# Patient Record
Sex: FEMALE | Race: WHITE | NOT HISPANIC OR LATINO | Employment: OTHER | URBAN - METROPOLITAN AREA
[De-identification: names, ages, dates, MRNs, and addresses within clinical notes are randomized per-mention and may not be internally consistent; named-entity substitution may affect disease eponyms.]

---

## 2023-11-20 ENCOUNTER — APPOINTMENT (OUTPATIENT)
Dept: RADIOLOGY | Facility: HOSPITAL | Age: 88
DRG: 082 | End: 2023-11-20
Payer: MEDICARE

## 2023-11-20 ENCOUNTER — HOSPITAL ENCOUNTER (INPATIENT)
Facility: HOSPITAL | Age: 88
LOS: 2 days | Discharge: HOME HEALTH | DRG: 082 | End: 2023-11-22
Attending: EMERGENCY MEDICINE | Admitting: SURGERY
Payer: MEDICARE

## 2023-11-20 DIAGNOSIS — S22.41XA CLOSED FRACTURE OF MULTIPLE RIBS OF RIGHT SIDE, INITIAL ENCOUNTER: ICD-10-CM

## 2023-11-20 DIAGNOSIS — R09.02 HYPOXIA: ICD-10-CM

## 2023-11-20 DIAGNOSIS — S06.5XAA SUBDURAL HEMATOMA (MULTI): ICD-10-CM

## 2023-11-20 DIAGNOSIS — S22.49XA: ICD-10-CM
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
## 2023-11-20 PROBLEM — S22.41XD CLOSED FRACTURE OF MULTIPLE RIBS OF RIGHT SIDE WITH ROUTINE HEALING: Status: ACTIVE | Noted: 2023-11-20

## 2023-11-20 LAB
ANION GAP SERPL CALC-SCNC: 13 MMOL/L (ref 10–20)
BUN SERPL-MCNC: 24 MG/DL (ref 6–23)
CALCIUM SERPL-MCNC: 9 MG/DL (ref 8.6–10.3)
CHLORIDE SERPL-SCNC: 100 MMOL/L (ref 98–107)
CK SERPL-CCNC: 28 U/L (ref 0–215)
CO2 SERPL-SCNC: 24 MMOL/L (ref 21–32)
CREAT SERPL-MCNC: 0.99 MG/DL (ref 0.5–1.05)
ERYTHROCYTE [DISTWIDTH] IN BLOOD BY AUTOMATED COUNT: 15.1 % (ref 11.5–14.5)
GFR SERPL CREATININE-BSD FRML MDRD: 55 ML/MIN/1.73M*2
GLUCOSE SERPL-MCNC: 95 MG/DL (ref 74–99)
HCT VFR BLD AUTO: 39.5 % (ref 36–46)
HGB BLD-MCNC: 12.5 G/DL (ref 12–16)
HOLD SPECIMEN: NORMAL
MCH RBC QN AUTO: 30.5 PG (ref 26–34)
MCHC RBC AUTO-ENTMCNC: 31.6 G/DL (ref 32–36)
MCV RBC AUTO: 96 FL (ref 80–100)
NRBC BLD-RTO: 0 /100 WBCS (ref 0–0)
PLATELET # BLD AUTO: 209 X10*3/UL (ref 150–450)
POTASSIUM SERPL-SCNC: 4.1 MMOL/L (ref 3.5–5.3)
RBC # BLD AUTO: 4.1 X10*6/UL (ref 4–5.2)
SODIUM SERPL-SCNC: 133 MMOL/L (ref 136–145)
WBC # BLD AUTO: 13.4 X10*3/UL (ref 4.4–11.3)

## 2023-11-20 PROCEDURE — 76377 3D RENDER W/INTRP POSTPROCES: CPT | Performed by: RADIOLOGY

## 2023-11-20 PROCEDURE — 2060000001 HC INTERMEDIATE ICU ROOM DAILY

## 2023-11-20 PROCEDURE — 36415 COLL VENOUS BLD VENIPUNCTURE: CPT | Performed by: PHYSICIAN ASSISTANT

## 2023-11-20 PROCEDURE — 99222 1ST HOSP IP/OBS MODERATE 55: CPT | Performed by: REGISTERED NURSE

## 2023-11-20 PROCEDURE — 80048 BASIC METABOLIC PNL TOTAL CA: CPT | Performed by: PHYSICIAN ASSISTANT

## 2023-11-20 PROCEDURE — 82550 ASSAY OF CK (CPK): CPT | Performed by: PHYSICIAN ASSISTANT

## 2023-11-20 PROCEDURE — 85027 COMPLETE CBC AUTOMATED: CPT | Performed by: PHYSICIAN ASSISTANT

## 2023-11-20 PROCEDURE — 72125 CT NECK SPINE W/O DYE: CPT

## 2023-11-20 PROCEDURE — 70450 CT HEAD/BRAIN W/O DYE: CPT

## 2023-11-20 PROCEDURE — 71045 X-RAY EXAM CHEST 1 VIEW: CPT | Mod: FY

## 2023-11-20 PROCEDURE — 70450 CT HEAD/BRAIN W/O DYE: CPT | Performed by: RADIOLOGY

## 2023-11-20 PROCEDURE — 2500000001 HC RX 250 WO HCPCS SELF ADMINISTERED DRUGS (ALT 637 FOR MEDICARE OP): Performed by: REGISTERED NURSE

## 2023-11-20 PROCEDURE — 70486 CT MAXILLOFACIAL W/O DYE: CPT | Performed by: RADIOLOGY

## 2023-11-20 PROCEDURE — 2500000004 HC RX 250 GENERAL PHARMACY W/ HCPCS (ALT 636 FOR OP/ED): Performed by: PHYSICIAN ASSISTANT

## 2023-11-20 PROCEDURE — 2500000004 HC RX 250 GENERAL PHARMACY W/ HCPCS (ALT 636 FOR OP/ED): Performed by: REGISTERED NURSE

## 2023-11-20 PROCEDURE — 71045 X-RAY EXAM CHEST 1 VIEW: CPT | Performed by: RADIOLOGY

## 2023-11-20 PROCEDURE — 99221 1ST HOSP IP/OBS SF/LOW 40: CPT | Performed by: NURSE PRACTITIONER

## 2023-11-20 PROCEDURE — 72125 CT NECK SPINE W/O DYE: CPT | Performed by: RADIOLOGY

## 2023-11-20 PROCEDURE — 71250 CT THORAX DX C-: CPT | Performed by: RADIOLOGY

## 2023-11-20 PROCEDURE — 99285 EMERGENCY DEPT VISIT HI MDM: CPT | Mod: 25 | Performed by: EMERGENCY MEDICINE

## 2023-11-20 PROCEDURE — 71250 CT THORAX DX C-: CPT

## 2023-11-20 PROCEDURE — 2500000005 HC RX 250 GENERAL PHARMACY W/O HCPCS: Performed by: PHYSICIAN ASSISTANT

## 2023-11-20 PROCEDURE — 70486 CT MAXILLOFACIAL W/O DYE: CPT

## 2023-11-20 PROCEDURE — 72170 X-RAY EXAM OF PELVIS: CPT | Performed by: RADIOLOGY

## 2023-11-20 PROCEDURE — 72170 X-RAY EXAM OF PELVIS: CPT | Mod: FY

## 2023-11-20 PROCEDURE — 99222 1ST HOSP IP/OBS MODERATE 55: CPT | Performed by: SURGERY

## 2023-11-20 RX ORDER — DICLOFENAC SODIUM 10 MG/G
4 GEL TOPICAL 3 TIMES DAILY PRN
COMMUNITY

## 2023-11-20 RX ORDER — HYDROCHLOROTHIAZIDE 12.5 MG/1
12.5 TABLET ORAL DAILY
Status: DISCONTINUED | OUTPATIENT
Start: 2023-11-20 | End: 2023-11-22 | Stop reason: HOSPADM

## 2023-11-20 RX ORDER — LIDOCAINE 560 MG/1
1 PATCH PERCUTANEOUS; TOPICAL; TRANSDERMAL DAILY
Status: DISCONTINUED | OUTPATIENT
Start: 2023-11-20 | End: 2023-11-20

## 2023-11-20 RX ORDER — MELOXICAM 15 MG/1
15 TABLET ORAL DAILY
Status: DISCONTINUED | OUTPATIENT
Start: 2023-11-20 | End: 2023-11-22 | Stop reason: HOSPADM

## 2023-11-20 RX ORDER — MELOXICAM 15 MG/1
15 TABLET ORAL DAILY
Status: ON HOLD | COMMUNITY
Start: 2020-09-30 | End: 2023-11-22 | Stop reason: SDUPTHER

## 2023-11-20 RX ORDER — IPRATROPIUM BROMIDE AND ALBUTEROL SULFATE 2.5; .5 MG/3ML; MG/3ML
3 SOLUTION RESPIRATORY (INHALATION) EVERY 4 HOURS PRN
Status: DISCONTINUED | OUTPATIENT
Start: 2023-11-20 | End: 2023-11-22 | Stop reason: HOSPADM

## 2023-11-20 RX ORDER — BRIMONIDINE TARTRATE AND TIMOLOL MALEATE 2; 5 MG/ML; MG/ML
1 SOLUTION OPHTHALMIC 2 TIMES DAILY
COMMUNITY

## 2023-11-20 RX ORDER — CYCLOSPORINE 0.5 MG/ML
1 EMULSION OPHTHALMIC 4 TIMES DAILY
COMMUNITY

## 2023-11-20 RX ORDER — HYDROCHLOROTHIAZIDE 12.5 MG/1
12.5 TABLET ORAL DAILY
COMMUNITY
End: 2023-11-30 | Stop reason: HOSPADM

## 2023-11-20 RX ORDER — ONDANSETRON HYDROCHLORIDE 2 MG/ML
4 INJECTION, SOLUTION INTRAVENOUS EVERY 8 HOURS PRN
Status: DISCONTINUED | OUTPATIENT
Start: 2023-11-20 | End: 2023-11-22 | Stop reason: HOSPADM

## 2023-11-20 RX ORDER — BRIMONIDINE TARTRATE 2 MG/ML
1 SOLUTION/ DROPS OPHTHALMIC 2 TIMES DAILY
Status: DISCONTINUED | OUTPATIENT
Start: 2023-11-20 | End: 2023-11-22 | Stop reason: HOSPADM

## 2023-11-20 RX ORDER — ACETAMINOPHEN 325 MG/1
975 TABLET ORAL EVERY 6 HOURS
Status: DISCONTINUED | OUTPATIENT
Start: 2023-11-20 | End: 2023-11-22 | Stop reason: HOSPADM

## 2023-11-20 RX ORDER — HYDRALAZINE HYDROCHLORIDE 20 MG/ML
5 INJECTION INTRAMUSCULAR; INTRAVENOUS EVERY 4 HOURS PRN
Status: DISCONTINUED | OUTPATIENT
Start: 2023-11-20 | End: 2023-11-21

## 2023-11-20 RX ORDER — UBIDECARENONE 75 MG
500 CAPSULE ORAL DAILY
COMMUNITY

## 2023-11-20 RX ORDER — LIDOCAINE 560 MG/1
1 PATCH PERCUTANEOUS; TOPICAL; TRANSDERMAL DAILY
Status: DISCONTINUED | OUTPATIENT
Start: 2023-11-20 | End: 2023-11-22 | Stop reason: HOSPADM

## 2023-11-20 RX ORDER — ONDANSETRON 4 MG/1
4 TABLET, FILM COATED ORAL EVERY 8 HOURS PRN
Status: DISCONTINUED | OUTPATIENT
Start: 2023-11-20 | End: 2023-11-22 | Stop reason: HOSPADM

## 2023-11-20 RX ORDER — MIRABEGRON 50 MG/1
50 TABLET, EXTENDED RELEASE ORAL DAILY
COMMUNITY

## 2023-11-20 RX ORDER — CETIRIZINE HYDROCHLORIDE 10 MG/1
10 TABLET ORAL DAILY PRN
COMMUNITY

## 2023-11-20 RX ORDER — PANTOPRAZOLE SODIUM 40 MG/1
40 TABLET, DELAYED RELEASE ORAL
Status: DISCONTINUED | OUTPATIENT
Start: 2023-11-21 | End: 2023-11-22 | Stop reason: HOSPADM

## 2023-11-20 RX ORDER — OXYBUTYNIN CHLORIDE 5 MG/1
5 TABLET ORAL 3 TIMES DAILY
Status: DISCONTINUED | OUTPATIENT
Start: 2023-11-20 | End: 2023-11-22 | Stop reason: HOSPADM

## 2023-11-20 RX ORDER — ACETAMINOPHEN 325 MG/1
650 TABLET ORAL EVERY 6 HOURS PRN
COMMUNITY

## 2023-11-20 RX ORDER — NICARDIPINE HYDROCHLORIDE 0.2 MG/ML
2.5-15 INJECTION INTRAVENOUS CONTINUOUS
Status: DISCONTINUED | OUTPATIENT
Start: 2023-11-20 | End: 2023-11-20

## 2023-11-20 RX ORDER — FLUTICASONE PROPIONATE 50 MCG
1 SPRAY, SUSPENSION (ML) NASAL DAILY PRN
COMMUNITY

## 2023-11-20 RX ORDER — BRIMONIDINE TARTRATE AND TIMOLOL MALEATE 2; 5 MG/ML; MG/ML
1 SOLUTION OPHTHALMIC 2 TIMES DAILY
Status: DISCONTINUED | OUTPATIENT
Start: 2023-11-20 | End: 2023-11-20

## 2023-11-20 RX ORDER — CARBOXYMETHYLCELLULOSE SODIUM 10 MG/ML
1 GEL OPHTHALMIC 2 TIMES DAILY
Status: DISCONTINUED | OUTPATIENT
Start: 2023-11-20 | End: 2023-11-20

## 2023-11-20 RX ORDER — CITALOPRAM 20 MG/1
20 TABLET, FILM COATED ORAL DAILY
Status: DISCONTINUED | OUTPATIENT
Start: 2023-11-20 | End: 2023-11-22 | Stop reason: HOSPADM

## 2023-11-20 RX ORDER — SODIUM CHLORIDE 1000 MG
1 TABLET, SOLUBLE MISCELLANEOUS DAILY
Status: DISCONTINUED | OUTPATIENT
Start: 2023-11-20 | End: 2023-11-22 | Stop reason: HOSPADM

## 2023-11-20 RX ORDER — FERROUS SULFATE, DRIED 160(50) MG
1 TABLET, EXTENDED RELEASE ORAL DAILY
COMMUNITY

## 2023-11-20 RX ORDER — LATANOPROST 50 UG/ML
1 SOLUTION/ DROPS OPHTHALMIC NIGHTLY
COMMUNITY
Start: 2019-02-20

## 2023-11-20 RX ORDER — CYCLOSPORINE 0.5 MG/ML
1 EMULSION OPHTHALMIC
Status: DISCONTINUED | OUTPATIENT
Start: 2023-11-20 | End: 2023-11-22 | Stop reason: HOSPADM

## 2023-11-20 RX ORDER — CITALOPRAM 20 MG/1
20 TABLET, FILM COATED ORAL DAILY
COMMUNITY
Start: 2020-09-30

## 2023-11-20 RX ORDER — LORATADINE 10 MG/1
10 TABLET ORAL DAILY
Status: DISCONTINUED | OUTPATIENT
Start: 2023-11-20 | End: 2023-11-22 | Stop reason: HOSPADM

## 2023-11-20 RX ORDER — LATANOPROST 50 UG/ML
1 SOLUTION/ DROPS OPHTHALMIC NIGHTLY
Status: DISCONTINUED | OUTPATIENT
Start: 2023-11-20 | End: 2023-11-22 | Stop reason: HOSPADM

## 2023-11-20 RX ORDER — GABAPENTIN 100 MG/1
100 CAPSULE ORAL 3 TIMES DAILY
Status: DISCONTINUED | OUTPATIENT
Start: 2023-11-20 | End: 2023-11-22 | Stop reason: HOSPADM

## 2023-11-20 RX ORDER — FERROUS SULFATE, DRIED 160(50) MG
1 TABLET, EXTENDED RELEASE ORAL DAILY
Status: DISCONTINUED | OUTPATIENT
Start: 2023-11-20 | End: 2023-11-22 | Stop reason: HOSPADM

## 2023-11-20 RX ORDER — ALENDRONATE SODIUM 70 MG/1
70 TABLET ORAL
COMMUNITY
Start: 2020-09-30

## 2023-11-20 RX ORDER — CARBOXYMETHYLCELLULOSE SODIUM 5 MG/ML
1 SOLUTION/ DROPS OPHTHALMIC 2 TIMES DAILY
COMMUNITY

## 2023-11-20 RX ORDER — POLYETHYLENE GLYCOL 3350 17 G/17G
17 POWDER, FOR SOLUTION ORAL DAILY
Status: DISCONTINUED | OUTPATIENT
Start: 2023-11-20 | End: 2023-11-22 | Stop reason: HOSPADM

## 2023-11-20 RX ORDER — UBIDECARENONE 75 MG
500 CAPSULE ORAL DAILY
Status: DISCONTINUED | OUTPATIENT
Start: 2023-11-20 | End: 2023-11-22 | Stop reason: HOSPADM

## 2023-11-20 RX ORDER — OMEPRAZOLE 20 MG/1
20 CAPSULE, DELAYED RELEASE ORAL DAILY
COMMUNITY
Start: 2020-09-30

## 2023-11-20 RX ORDER — SODIUM CHLORIDE 1000 MG
1 TABLET, SOLUBLE MISCELLANEOUS DAILY
COMMUNITY

## 2023-11-20 RX ORDER — FLUORIDE TOOTHPASTE
15 TOOTHPASTE DENTAL 2 TIMES DAILY
COMMUNITY

## 2023-11-20 RX ADMIN — GABAPENTIN 100 MG: 100 CAPSULE ORAL at 22:54

## 2023-11-20 RX ADMIN — ACETAMINOPHEN 975 MG: 325 TABLET ORAL at 17:50

## 2023-11-20 RX ADMIN — ACETAMINOPHEN 975 MG: 325 TABLET ORAL at 22:52

## 2023-11-20 RX ADMIN — BRIMONIDINE TARTRATE 1 DROP: 2 SOLUTION/ DROPS OPHTHALMIC at 23:03

## 2023-11-20 RX ADMIN — GABAPENTIN 100 MG: 100 CAPSULE ORAL at 09:37

## 2023-11-20 RX ADMIN — LATANOPROST 1 DROP: 50 SOLUTION OPHTHALMIC at 23:02

## 2023-11-20 RX ADMIN — CYCLOSPORINE 1 DROP: 0.5 EMULSION OPHTHALMIC at 22:54

## 2023-11-20 RX ADMIN — OXYBUTYNIN CHLORIDE 5 MG: 5 TABLET ORAL at 22:54

## 2023-11-20 RX ADMIN — LIDOCAINE 1 PATCH: 4 PATCH TOPICAL at 09:27

## 2023-11-20 RX ADMIN — TIMOLOL 1 DROP: 5.12 SOLUTION/ DROPS OPHTHALMIC at 23:10

## 2023-11-20 RX ADMIN — NICARDIPINE HYDROCHLORIDE 2.5 MG/HR: 0.2 INJECTION, SOLUTION INTRAVENOUS at 09:37

## 2023-11-20 RX ADMIN — ACETAMINOPHEN 975 MG: 325 TABLET ORAL at 09:37

## 2023-11-20 RX ADMIN — GABAPENTIN 100 MG: 100 CAPSULE ORAL at 17:50

## 2023-11-20 SDOH — SOCIAL STABILITY: SOCIAL INSECURITY: DO YOU FEEL ANYONE HAS EXPLOITED OR TAKEN ADVANTAGE OF YOU FINANCIALLY OR OF YOUR PERSONAL PROPERTY?: NO

## 2023-11-20 SDOH — SOCIAL STABILITY: SOCIAL INSECURITY: ABUSE: ADULT

## 2023-11-20 SDOH — SOCIAL STABILITY: SOCIAL INSECURITY: HAS ANYONE EVER THREATENED TO HURT YOUR FAMILY OR YOUR PETS?: NO

## 2023-11-20 SDOH — SOCIAL STABILITY: SOCIAL INSECURITY: DOES ANYONE TRY TO KEEP YOU FROM HAVING/CONTACTING OTHER FRIENDS OR DOING THINGS OUTSIDE YOUR HOME?: NO

## 2023-11-20 SDOH — SOCIAL STABILITY: SOCIAL INSECURITY: DO YOU FEEL UNSAFE GOING BACK TO THE PLACE WHERE YOU ARE LIVING?: NO

## 2023-11-20 SDOH — SOCIAL STABILITY: SOCIAL INSECURITY: HAVE YOU HAD THOUGHTS OF HARMING ANYONE ELSE?: NO

## 2023-11-20 SDOH — SOCIAL STABILITY: SOCIAL INSECURITY: ARE YOU OR HAVE YOU BEEN THREATENED OR ABUSED PHYSICALLY, EMOTIONALLY, OR SEXUALLY BY ANYONE?: NO

## 2023-11-20 SDOH — SOCIAL STABILITY: SOCIAL INSECURITY: ARE THERE ANY APPARENT SIGNS OF INJURIES/BEHAVIORS THAT COULD BE RELATED TO ABUSE/NEGLECT?: NO

## 2023-11-20 SDOH — SOCIAL STABILITY: SOCIAL INSECURITY: WERE YOU ABLE TO COMPLETE ALL THE BEHAVIORAL HEALTH SCREENINGS?: YES

## 2023-11-20 ASSESSMENT — ACTIVITIES OF DAILY LIVING (ADL)
BATHING: NEEDS ASSISTANCE
DRESSING YOURSELF: NEEDS ASSISTANCE
HEARING - LEFT EAR: FUNCTIONAL
GROOMING: NEEDS ASSISTANCE
HEARING - RIGHT EAR: FUNCTIONAL
ASSISTIVE_DEVICE: WALKER
WALKS IN HOME: NEEDS ASSISTANCE
FEEDING YOURSELF: NEEDS ASSISTANCE
PATIENT'S MEMORY ADEQUATE TO SAFELY COMPLETE DAILY ACTIVITIES?: NO
JUDGMENT_ADEQUATE_SAFELY_COMPLETE_DAILY_ACTIVITIES: NO
ADEQUATE_TO_COMPLETE_ADL: YES
LACK_OF_TRANSPORTATION: NO
LACK_OF_TRANSPORTATION: NO
TOILETING: NEEDS ASSISTANCE

## 2023-11-20 ASSESSMENT — LIFESTYLE VARIABLES
REASON UNABLE TO ASSESS: YES
EVER FELT BAD OR GUILTY ABOUT YOUR DRINKING: NO
HOW OFTEN DO YOU HAVE A DRINK CONTAINING ALCOHOL: NEVER
PRESCIPTION_ABUSE_PAST_12_MONTHS: NO
AUDIT-C TOTAL SCORE: 0
SUBSTANCE_ABUSE_PAST_12_MONTHS: NO
HOW OFTEN DO YOU HAVE 6 OR MORE DRINKS ON ONE OCCASION: NEVER
HAVE PEOPLE ANNOYED YOU BY CRITICIZING YOUR DRINKING: NO
AUDIT-C TOTAL SCORE: 0
HOW MANY STANDARD DRINKS CONTAINING ALCOHOL DO YOU HAVE ON A TYPICAL DAY: PATIENT DOES NOT DRINK
EVER HAD A DRINK FIRST THING IN THE MORNING TO STEADY YOUR NERVES TO GET RID OF A HANGOVER: NO
SKIP TO QUESTIONS 9-10: 1
HAVE YOU EVER FELT YOU SHOULD CUT DOWN ON YOUR DRINKING: NO

## 2023-11-20 ASSESSMENT — COGNITIVE AND FUNCTIONAL STATUS - GENERAL
MOBILITY SCORE: 15
PATIENT BASELINE BEDBOUND: NO
TURNING FROM BACK TO SIDE WHILE IN FLAT BAD: A LITTLE
DRESSING REGULAR UPPER BODY CLOTHING: A LITTLE
PERSONAL GROOMING: A LITTLE
WALKING IN HOSPITAL ROOM: A LOT
DAILY ACTIVITIY SCORE: 18
DRESSING REGULAR LOWER BODY CLOTHING: A LITTLE
STANDING UP FROM CHAIR USING ARMS: A LITTLE
MOVING TO AND FROM BED TO CHAIR: A LOT
MOVING FROM LYING ON BACK TO SITTING ON SIDE OF FLAT BED WITH BEDRAILS: A LITTLE
HELP NEEDED FOR BATHING: A LITTLE
CLIMB 3 TO 5 STEPS WITH RAILING: A LOT
EATING MEALS: A LITTLE
TOILETING: A LITTLE

## 2023-11-20 ASSESSMENT — PAIN SCALES - PAIN ASSESSMENT IN ADVANCED DEMENTIA (PAINAD)
FACIALEXPRESSION: SMILING OR INEXPRESSIVE
BREATHING: NORMAL
BODYLANGUAGE: RELAXED
TOTALSCORE: 0
CONSOLABILITY: NO NEED TO CONSOLE

## 2023-11-20 ASSESSMENT — PATIENT HEALTH QUESTIONNAIRE - PHQ9
SUM OF ALL RESPONSES TO PHQ9 QUESTIONS 1 & 2: 0
1. LITTLE INTEREST OR PLEASURE IN DOING THINGS: NOT AT ALL
2. FEELING DOWN, DEPRESSED OR HOPELESS: NOT AT ALL

## 2023-11-20 ASSESSMENT — COLUMBIA-SUICIDE SEVERITY RATING SCALE - C-SSRS
1. IN THE PAST MONTH, HAVE YOU WISHED YOU WERE DEAD OR WISHED YOU COULD GO TO SLEEP AND NOT WAKE UP?: NO
6. HAVE YOU EVER DONE ANYTHING, STARTED TO DO ANYTHING, OR PREPARED TO DO ANYTHING TO END YOUR LIFE?: NO
2. HAVE YOU ACTUALLY HAD ANY THOUGHTS OF KILLING YOURSELF?: NO
2. HAVE YOU ACTUALLY HAD ANY THOUGHTS OF KILLING YOURSELF?: NO
6. HAVE YOU EVER DONE ANYTHING, STARTED TO DO ANYTHING, OR PREPARED TO DO ANYTHING TO END YOUR LIFE?: NO
1. IN THE PAST MONTH, HAVE YOU WISHED YOU WERE DEAD OR WISHED YOU COULD GO TO SLEEP AND NOT WAKE UP?: NO

## 2023-11-20 ASSESSMENT — PAIN - FUNCTIONAL ASSESSMENT
PAIN_FUNCTIONAL_ASSESSMENT: UNABLE TO SELF-REPORT
PAIN_FUNCTIONAL_ASSESSMENT: PAINAD (PAIN ASSESSMENT IN ADVANCED DEMENTIA SCALE)

## 2023-11-20 NOTE — ED PROVIDER NOTES
Limitations to History: clinical condition  External Records Reviewed  Independent Historians: none  Social determinants affecting care: none    HPI  Ayse Cardoza is a 89 y.o. female polymyalgia rheumatica, glaucoma, arthritis, hypertension, GERD, chronic kidney disease, asthma, venous insufficiency, anemia, depression, mild cognitive impairment, DVT, who presents to the emergency department for assessment of a fall today.  She is unsure what happened.  She is complaining of pain to the right side of her chest.  She is unsure of loss of consciousness.  She is not on anticoagulants.  She presents to the ER from an assisted living facility.  Attempted to get a hold of the nursing staff but unsuccessful.  Her paperwork shows that she is a DNR ml.  [facility]  Past Medical History:   Diagnosis Date    Anemia     Asthma     Chronic hyponatremia     CKD (chronic kidney disease)     Depression     HTN (hypertension)     MCI (mild cognitive impairment)     Polymyalgia rheumatica (CMS/HCC)     SDH (subdural hematoma) (CMS/HCC) 11/20/2023    Stroke (cerebrum) (CMS/[facility])     White coat syndrome with hypertension     reviewed by myself.    Meds  Current Outpatient Medications   Medication Instructions    acetaminophen (TYLENOL) 650 mg, oral, Every 6 hours PRN    alendronate (FOSAMAX) 70 mg, oral, Every 7 days    brimonidine-timoloL (Combigan) 0.2-0.5 % ophthalmic solution 1 drop, Both Eyes, 2 times daily    calcium carbonate-vitamin D3 500 mg-5 mcg (200 unit) tablet 1 tablet, oral, Daily    carboxymethylcellulose (Refresh Plus) 0.5 % ophthalmic solution 1 drop, Both Eyes, 2 times daily    cetirizine (ZYRTEC) 10 mg, oral, Daily PRN    citalopram (CELEXA) 20 mg, oral, Daily    cyanocobalamin (VITAMIN B-12) 500 mcg, oral, Daily    cycloSPORINE (Restasis) 0.05 % ophthalmic emulsion 1 drop, Both Eyes, 4 times daily    diclofenac sodium (VOLTAREN) 4 g, Topical, 3 times daily PRN, To right lower leg    fluticasone (Flonase) 50  "mcg/actuation nasal spray 1 spray, Each Nostril, Daily PRN, Shake gently. Before first use, prime pump. After use, clean tip and replace cap.    hydroCHLOROthiazide (HYDRODIURIL) 12.5 mg, oral, Daily    latanoprost (Xalatan) 0.005 % ophthalmic solution 1 drop, Both Eyes, Nightly    meloxicam (MOBIC) 15 mg, oral, Daily    mirabegron (MYRBETRIQ) 50 mg, oral, Daily    moisturizing mouth (Biotene Dry Mouth Oral Rinse) solution 15 mL, Swish & Spit, 2 times daily    omeprazole (PRILOSEC) 20 mg, oral, Daily    sodium chloride 1 g, oral, Daily       Allergies  Allergies   Allergen Reactions    Fenofibrate Myalgia    Statins-Hmg-Coa Reductase Inhibitors Myalgia    Cefazolin Rash    Gabapentin Other     felt goofy on it    Lisinopril Cough    Penicillins Rash    reviewed by myself.    SHx  Social History     Tobacco Use    Smoking status: Never    Smokeless tobacco: Never   Substance Use Topics    Alcohol use: Yes     Comment: rare    Drug use: Not Currently    reviewed by myself.      ------------------------------------------------------------------------------------------------------------------------------------------    /51   Pulse 69   Resp 20   Ht 1.651 m (5' 5\")   Wt 95.3 kg (210 lb)   SpO2 99%   BMI 34.95 kg/m²     Physical Exam  Vitals and nursing note reviewed.   Constitutional:       General: She is not in acute distress.     Appearance: Normal appearance. She is normal weight. She is not ill-appearing or toxic-appearing.   HENT:      Head: Normocephalic. Contusion (left cheek) and laceration (left cheek 2cm linear, nongaping) present. No raccoon eyes or Rodriguez's sign.      Jaw: No trismus.      Ears:      Comments: Hearing aids     Nose: Nose normal.      Mouth/Throat:      Mouth: Mucous membranes are moist.      Dentition: Normal dentition. No dental tenderness.      Pharynx: Oropharynx is clear.      Comments: Puncture wound right upper lip that does not fully go through the lip. Skin tear upper " lip.  Eyes:      Extraocular Movements: Extraocular movements intact.      Conjunctiva/sclera: Conjunctivae normal.      Pupils: Pupils are equal, round, and reactive to light.   Cardiovascular:      Rate and Rhythm: Normal rate and regular rhythm.   Pulmonary:      Effort: Pulmonary effort is normal.      Breath sounds: No decreased air movement (throughout).      Comments: Slight expiratory wheeze on the right  Chest:      Comments: Right sided chest wall tenderness without flail chest or crepitus. No bruising to chest wall  Abdominal:      General: Abdomen is flat. Bowel sounds are normal.      Palpations: Abdomen is soft.      Tenderness: There is no abdominal tenderness.   Musculoskeletal:         General: Normal range of motion.      Cervical back: Normal range of motion and neck supple. No crepitus. No pain with movement or spinous process tenderness. Normal range of motion.      Comments: Moving all extremities.   Skin:     General: Skin is warm and dry.      Comments: Bruise right shin   Neurological:      General: No focal deficit present.      Mental Status: She is alert. Mental status is at baseline.      GCS: GCS eye subscore is 4. GCS verbal subscore is 5. GCS motor subscore is 6.      Cranial Nerves: Cranial nerves 2-12 are intact.      Sensory: Sensation is intact.      Motor: Motor function is intact.      Coordination: Coordination is intact.   Psychiatric:         Attention and Perception: Attention normal.         Mood and Affect: Mood normal.         ------------------------------------------------------------------------------------------------------------------------------------------  Imaging  CT head wo IV contrast   Final Result   1. Trace subdural hematoma along the right cerebral convexity and   right tentorial leaflet measuring an estimated 2-3 mm in thickness.   No significant intracranial mass effect.   2. Left cheek facial laceration with small amount of subcutaneous   emphysema and  soft tissue contusion component. There is questionable   diastasis of the lateral wall of the left orbit with slight   asymmetric widening of the suture. Otherwise there is no acute facial   fracture identified.   3. Moderate to severe chronic small vessel ischemic disease. Moderate   generalized brain atrophy.        MACRO:   Cruz Jacob discussed the significance and urgency of this critical   finding by telephone with  Dr. Fields on 11/20/2023 at 8:12 am.   (**-RCF-**) Findings:  See findings.             Signed by: Cruz Jacob 11/20/2023 8:13 AM   Dictation workstation:   [id number]      CT cervical spine wo IV contrast   Final Result   Diffusely decreased bone mineralization with no evidence for an acute   fracture or subluxation of the cervical spine. Multilevel   degenerative appearing spondylolisthesis.        MACRO:   None        Signed by: Cruz Jacob 11/20/2023 8:18 AM   Dictation workstation:   [id number]      CT maxillofacial bones wo IV contrast   Final Result   1. Trace subdural hematoma along the right cerebral convexity and   right tentorial leaflet measuring an estimated 2-3 mm in thickness.   No significant intracranial mass effect.   2. Left cheek facial laceration with small amount of subcutaneous   emphysema and soft tissue contusion component. There is questionable   diastasis of the lateral wall of the left orbit with slight   asymmetric widening of the suture. Otherwise there is no acute facial   fracture identified.   3. Moderate to severe chronic small vessel ischemic disease. Moderate   generalized brain atrophy.        MACRO:   Cruz Jacob discussed the significance and urgency of this critical   finding by telephone with  Dr. Fields on 11/20/2023 at 8:12 am.   (**-RCF-**) Findings:  See findings.             Signed by: Cruz Jacob 11/20/2023 8:13 AM   Dictation workstation:   [id number]      CT chest wo IV contrast   Final Result   1. Diffusely decreased bone mineralization  and respiratory motion   artifact degrades assessment with fracture deformity involving the   anterolateral aspect of the right 2nd through 7th ribs including   small segmental components. Fractures are presumably acute given   reported trauma and pain symptoms. There is no pneumothorax or right   pleural fluid collection.   2. Bronchial wall thickening with intermittent segmental airway   occlusion bilaterally. There are mild nonspecific consolidative and   ground-glass components within the periphery of the   right-greater-than-left lungs that could reflect elements of   atelectasis, edema, pneumonia, and/or possibly even fibrosis. Trace   left pleural effusion.   3. Cardiomegaly with coronary artery atherosclerotic calcification.        MACRO:   None        Signed by: Cruz Jacob 11/20/2023 8:34 AM   Dictation workstation:   [id number]      XR chest 1 view   Final Result   Patchy airspace opacities in both lungs developed in the interval   from the prior study suggestive of multifocal pneumonia. The limited   assessment of the thoracic wall provided by the AP radiograph of the   chest demonstrates no gross osseous abnormality. Dedicated series can   be considered.        Signed by: Luis Alberto Szymanski 11/20/2023 7:15 AM   Dictation workstation:   [id number]      XR pelvis 1-2 views   Final Result   No acute pelvic radiographic findings.        Signed by: Luis Alberto Szymanski 11/20/2023 7:16 AM   Dictation workstation:   [id number]      CT head wo IV contrast    (Results Pending)   XR chest 1 view    (Results Pending)        Labs  Labs Reviewed   CBC - Abnormal       Result Value    WBC 13.4 (*)     nRBC 0.0      RBC 4.10      Hemoglobin 12.5      Hematocrit 39.5      MCV 96      MCH 30.5      MCHC 31.6 (*)     RDW 15.1 (*)     Platelets 209     BASIC METABOLIC PANEL - Abnormal    Glucose 95      Sodium 133 (*)     Potassium 4.1      Chloride 100      Bicarbonate 24      Anion Gap 13      Urea Nitrogen 24 (*)      Creatinine 0.99      eGFR 55 (*)     Calcium 9.0     CREATINE KINASE - Normal    Creatine Kinase 28          ED Course  Diagnoses as of 11/20/23 1241   Fall, initial encounter   Subdural hematoma (CMS/HCC)   Closed fracture of multiple ribs of right side, initial encounter   Hypoxia        Medical Decision Making: She had crusted blood and bruising on her face.  She does have a laceration and puncture wound that was cleaned with normal saline and repaired with Dermabond.  The skin tear to the lip cannot be repaired and bacitracin was placed over this.  Her vital signs were reviewed.  Initially hypertensive with normal oxygenation however when I am in the room with her she is 85 to 87% on room air.  She was placed on 2 L of oxygen via nasal cannula and did improve.  I attempted to get a hold of the nursing staff for further information but unsuccessful.    Differential diagnoses considered: Intracranial hemorrhage, concussion, cervical fracture, cervical strain, orbital fracture, maxillary sinus fracture, facial contusion, rib fracture, pneumothorax, pneumonia, rib contusion, others    Medications given: Topical lidocaine patch    I reviewed the labs from today.  Slight leukocytosis.  H&H stable.  No significant electrolyte abnormalities.  CPK is normal.    Nursing staff returned my call at approximately 0630.  They report that they went to go check on her due to being notified there was a fall.  Nursing staff reports that the patient was trying to get up to go to the bathroom and she fell and hit her face on the coffee table.  She was sent out for further assessment.    I spoke with the patient's son, Paulo, who is her POA. ([phone]) He reports that she would just want to be kept comfortable but could be admitted to the hospital for pain control and oxygen support.  Does not want CPR or intubation.    CT of the head is showing a trace subdural hematoma.  CT of the cervical spine showing no acute fracture.  CT of  the chest is showing fractures from ribs 2 through 7.    Her blood pressure is elevated and she was started on a Cardene drip due to the trace subdural hematoma.    I spoke with the patient's POA, Paulo, again about the findings on the imaging today.  He did confirm that she is to be kept comfortable.  No surgery, CPR, intubation.  I did order a Cardene drip due to her hypertension with the trace subdural.  She was given topical lidocaine patch for her rib pain.  I did not call neurosurgery as she is a DNR CC.    I consulted the trauma surgeon on-call due to her multiple injuries to admit the patient. I spoke with Dr. Arriola who will admit. Surgical VIVIAN notified.    Patient's blood pressure did become low after being on the Cardene and this medication was stopped.  Surgical VIVIAN wants the patient still on telemetry despite her being a DNR CC for blood pressure management.  She can order as needed labetalol IV pushes for BP control.    Diagnosis: Subdural hematoma, multiple rib fractures, hypoxia   Plan: admit           Cam Morales PA-C  11/20/23 7394

## 2023-11-20 NOTE — ED TRIAGE NOTES
Pt presents to ED via EMS from [facility] after a fall. Per EMS, pt was attempting to get up to the bathroom and fell, hitting her head on the bedside table. Pt arrives with midsternal chest pain, lacerations to face and skin tear to left shoulder. Pt a&ox2.

## 2023-11-20 NOTE — PROGRESS NOTES
1319: Message from [facility] from [facility] regarding active with patient. TCC placed call back to [facility] from [facility] [phone]. Confirmed active with patient.   Care transitions assessment completed via telephone with patient's sonPaulo [phone]. TCC introduced self and explained role. Demographics verified. Patient from [facility] with spouse.  Needs assistance with ADLs. Active with [facility] for PT/OT. Per request from [facility] with [facility], referral sent to [facility] via AC, awaiting response.  SonPaulo, states plan is to return to [facility] with resumption of [facility], pending hospital course and PT/OT evals.  TCC to continue to follow for discharge planning needs.      PCP: per [facility] facility    11/20/23 9019   Discharge Planning   Living Arrangements Spouse/significant other  ([facility])   Support Systems Children;Spouse/significant other   Assistance Needed Needs assistance with ADLs at A.L. Can use walker 12-15 ft, WC to meals   Type of Residence Assisted living   Care Facility Name [facility]   Who is requesting discharge planning? Provider   Home or Post Acute Services In home services   Type of Post Acute Facility Services Assisted living;Rehab   Type of Home Care Services Home OT;Home PT   Patient expects to be discharged to: Return [facility], has PT/OT through [facility]   Does the patient need discharge transport arranged? Yes   RoundTrip coordination needed? Yes   Has discharge transport been arranged? No   Patient Choice   Provider Choice list and CMS website (https://medicare.gov/care-compare#search) for post-acute Quality and Resource Measure Data were provided and reviewed with: Family   Patient / Family choosing to utilize agency / facility established prior to hospitalization Yes       TALYA MARSHALL, RN ED TCC

## 2023-11-20 NOTE — H&P
History Of Present Illness  Ayse Cardoza is a 89 y.o. female who presented to [facility] ED on 11/20 via EMS from [facility]. Patient was an unwitnessed fall. Patient does not recall falling. Is unsure of any LOC. She has a history of mild cognitive impairment and history/ROS was limited, therefore, majority of HPI obtained through chart review.  Reportedly, patient fell while trying to go to the bathroom. She fell hitting her left cheek on her bedside table.    Patient has a past medical history significant for HTN, HLD, CAD, asthma, anemia, venous insuffiencey, h/o DVT (not on AC), arthritis, polymyalgia rheumatica, glaucoma, mild cogitative impairment, and GERD.    Imaging in ED identified acute right 2-7th rib fractures and trace right SDH (2-3mm). Patient also with left cheek laceration/soft tissue injury and left shoulder/right elbow skin tear.    PRIMARY SURVEY:  Airway: intact  Breathing: equal breath sounds; slight tachypnea   Circulation: pulses intact and equal  Disability:  GCS 14, A&Ox 2 (At her Baseline)  Exposure/Environment: All clothing removed and patient fully evaluated. Covered with multiple blankets       SECONDARY SURVEY:    Past Medical History:   Diagnosis Date    Anemia     Asthma     Chronic hyponatremia     CKD (chronic kidney disease)     Depression     HTN (hypertension)     MCI (mild cognitive impairment)     Polymyalgia rheumatica (CMS/HCC)     SDH (subdural hematoma) (CMS/HCC) 11/20/2023    Stroke (cerebrum) (CMS/HCC)     White coat syndrome with hypertension      Past Surgical History:   Procedure Laterality Date    APPENDECTOMY      CARPAL TUNNEL RELEASE  05/27/2014    Neuroplasty Decompression Median Nerve At Carpal Tunnel    CT ANGIO NECK  12/19/2018    CT NECK ANGIO W AND WO IV CONTRAST 12/19/2018 [facility]    CT HEAD ANGIO W AND WO IV CONTRAST  12/19/2018    CT HEAD ANGIO W AND WO IV CONTRAST 12/19/2018 [facility]    KNEE SURGERY  "Bilateral 05/27/2014    LUMBAR LAMINECTOMY      TOTAL HIP ARTHROPLASTY Bilateral          Social History     Tobacco Use    Smoking status: Never    Smokeless tobacco: Never   Substance Use Topics    Alcohol use: Yes     Comment: rare    Drug use: Not Currently     Family History   Problem Relation Name Age of Onset    Heart failure Mother      Asthma Mother      Coronary artery disease Brother         Allergies  Fenofibrate, Statins-hmg-coa reductase inhibitors, Cefazolin, Gabapentin, Lisinopril, and Penicillins    Review of Systems  Patient could not recall symptoms prior to admission.  At present, endorsing pain under \"right breast\" and pain with deep breath. Cannot remember fall. Did know she was in hospital, but was asking which one. States she never knows the year or who the president is; however, knew that it was almost Thanksgiving.      Last Recorded Vitals  Blood pressure 103/51, pulse 69, resp. rate 20, height 1.651 m (5' 5\"), weight 95.3 kg (210 lb), SpO2 99 %.    Objective   Vitals:  Most Recent:  Vitals:    11/20/23 1145   BP: 103/51   Pulse: 69   Resp: 20   SpO2: 99%       24hr Min/Max:  Pulse  Min: 65  Max: 75  BP  Min: 91/50  Max: 223/100  Resp  Min: 19  Max: 36  SpO2  Min: 82 %  Max: 100 %      Physical exam:    NEURO: A&O x2 (disoriented to Year, but knew it was almost thanksgiving), GCS 14, CN II-XII intact, KELLEY equally, muscle strength 5/5 (slightly weak BUE 4-5/5, but symemtric), no sensory deficits  HEAD: NC, left cheek laceration with palpable subcutaneous emphysema. Significant swelling and ecchymosis over left cheek extending down towards left jawline. Midface stable.  EENT: PERRL, EOMI. Eyes quite sunken with periorbital muscle wasting. Pupils 3mm b/l. external ear without laceration. Nasal septum midline, no crepitus or septal hematoma, however, crepitus over left cheek. Right upper lip laceration and swelling with dried blood. Tongue without lacerations, teeth in place, but poor repair. " Missing several teeth. Tongue dry.  NECK: No cervical spine tenderness or step offs, no lacerations or abrasions, trachea midline. No JVD. No neck swelling despite left lateral jaw soft tissue swelling  RESPIRATORY/CHEST: No abrasions, contusions, or crepitus. Tenderness to palpation right anterolateral chest wall. Shallow respirations. Non-labored, equal chest expansion, fine crackles bilaterally. Tachypnea at times. 3L NC  CV: RRR, nml S1 and S2. Pulses bilateral: 2+ radial, 2+DP, 2+PT,  2+femoral and 2+ carotid. ABDOMEN: soft, nontender, nondistended. No scars, abrasions or lacerations.  PELVIS: Stable to compression. Nontender   : nml external genitalia, no blood at urethral meatus  RECTAL: rectal tone intact with no gross blood noted on exam.  BACK/SPINE: No thoracic midline tenderness, step-offs or deformities. No lumbar midline tenderness, step-offs, or deformities.  No abrasions, hematomas or lacerations noted.  EXTREMITIES: No edema or cyanosis. Bilateral hip and knee scars. Right lateral lower leg hematoma with small open scab/oozing blood. Nml ROM w/o pain. No deformities, lacerations or contusions.      Lab/Radiology/Diagnostic Review:  Results for orders placed or performed during the hospital encounter of 11/20/23 (from the past 24 hour(s))   PST Top   Result Value Ref Range    Extra Tube Hold for add-ons.    CBC   Result Value Ref Range    WBC 13.4 (H) 4.4 - 11.3 x10*3/uL    nRBC 0.0 0.0 - 0.0 /100 WBCs    RBC 4.10 4.00 - 5.20 x10*6/uL    Hemoglobin 12.5 12.0 - 16.0 g/dL    Hematocrit 39.5 36.0 - 46.0 %    MCV 96 80 - 100 fL    MCH 30.5 26.0 - 34.0 pg    MCHC 31.6 (L) 32.0 - 36.0 g/dL    RDW 15.1 (H) 11.5 - 14.5 %    Platelets 209 150 - 450 x10*3/uL   Basic metabolic panel   Result Value Ref Range    Glucose 95 74 - 99 mg/dL    Sodium 133 (L) 136 - 145 mmol/L    Potassium 4.1 3.5 - 5.3 mmol/L    Chloride 100 98 - 107 mmol/L    Bicarbonate 24 21 - 32 mmol/L    Anion Gap 13 10 - 20 mmol/L    Urea  Nitrogen 24 (H) 6 - 23 mg/dL    Creatinine 0.99 0.50 - 1.05 mg/dL    eGFR 55 (L) >60 mL/min/1.73m*2    Calcium 9.0 8.6 - 10.3 mg/dL   Creatine Kinase   Result Value Ref Range    Creatine Kinase 28 0 - 215 U/L     CT chest wo IV contrast  Result Date: 11/20/2023  1. Diffusely decreased bone mineralization and respiratory motion artifact degrades assessment with fracture deformity involving the anterolateral aspect of the right 2nd through 7th ribs including small segmental components. Fractures are presumably acute given reported trauma and pain symptoms. There is no pneumothorax or right pleural fluid collection. 2. Bronchial wall thickening with intermittent segmental airway occlusion bilaterally. There are mild nonspecific consolidative and ground-glass components within the periphery of the right-greater-than-left lungs that could reflect elements of atelectasis, edema, pneumonia, and/or possibly even fibrosis. Trace left pleural effusion. 3. Cardiomegaly with coronary artery atherosclerotic calcification.   MACRO: None   Signed by: Cruz Jacob 11/20/2023 8:34 AM Dictation workstation:   [id number]      CT cervical spine wo IV contrast  CT head wo IV contrast  CT maxillofacial bones wo IV contrast  Result Date: 11/20/2023     Diffusely decreased bone mineralization with no evidence for an acute fracture or subluxation of the cervical spine. Multilevel degenerative appearing spondylolisthesis.   MACRO: None   Signed by: Cruz Jacob 11/20/2023 8:18 AM Dictation workstation:   [id number]    1. Trace subdural hematoma along the right cerebral convexity and right tentorial leaflet measuring an estimated 2-3 mm in thickness. No significant intracranial mass effect. 2. Left cheek facial laceration with small amount of subcutaneous emphysema and soft tissue contusion component. There is questionable diastasis of the lateral wall of the left orbit with slight asymmetric widening of the suture. Otherwise there is  no acute facial fracture identified. 3. Moderate to severe chronic small vessel ischemic disease. Moderate generalized brain atrophy.   MACRO: Cruz Jacob discussed the significance and urgency of this critical finding by telephone with  Dr. Fields on 11/20/2023 at 8:12 am. (**-RCF-**) Findings:  See findings.     Signed by: Cruz Jacob 11/20/2023 8:13 AM Dictation workstation:   [id number]      XR pelvis 1-2 views  No acute pelvic radiographic findings.   Signed by: Luis Alberto Szymanski 11/20/2023 7:16 AM Dictation workstation:   [id number]      XR chest 1 view  Patchy airspace opacities in both lungs developed in the interval from the prior study suggestive of multifocal pneumonia. The limited assessment of the thoracic wall provided by the AP radiograph of the chest demonstrates no gross osseous abnormality. Dedicated series can be considered.   Signed by: Luis Alberto Szymanski 11/20/2023 7:15 AM Dictation workstation:   [id number]         Assessment/Plan   Principal Problem:    SDH (subdural hematoma) (CMS/HCC)  Active Problems:    Fall, initial encounter    Closed traumatic nondisplaced fracture of multiple ribs, initial encounter      89 year old female, resident of assisted living facility, with a past medical history significant for HTN, HLD, CAD, asthma, anemia, venous insuffiencey, h/o DVT (not on AC), arthritis, polymyalgia rheumatica, glaucoma, mild cogitative impairment, and GERD presented to [facility] ED on 11/20 after an unwitnessed fall. Admitted to Trauma service with consult to medicine and NSGY.    List of Clinically significant Injuries:  - Right 2-7 rib fractures  - Right SDH (2-3mm)  - Left lateral orbit injury    #right SDH  > No neuro deficits at this time  - repeat CT Head in 6 hours  - HOB elevated > 45 degrees  - Goal -140  - PRN hydralazine for SBP > 140   (> previously on Cardene gtt in ED, but only briefly - became hypotensive)  - continue home PO BP med  - okay for diet  - per son, no  surgical intervention would be accepted by family; however, will consult NSGY to weigh in on medical management/comment on repeat CT head  - NO anticoagulation or anti-platelets at this time  - avoid NSAIDs (hold home med)    #acute right sided rib fractures  #asthma   #acute pulmonary insuffiencey with O2 requirement   #leukocytosis - likely reactive   - PRN duonebs  - pain management:   > scheduled Tylenol, scheduled gabapentin, PRN Lidoderm patch    > hold off on opioids at this time given age  - IS every 1 hour   - repeat CXR in AM  - low threshold for Abx, but defer to medicine     #HTN   > SBP 200s on admission to ED; still in 160-180s prompting Cardene gtt; quickly weaned off  - continue home hydrochlorothiazide   - PRN hydralazine     #h/o chronic hyponatremia - asymptomatic at this time  - repeat AM BMP; monitor as patient is on hydrochlorothiazide as well    #possible left lateral orbit injury  > patient denies any visual field deficits and only endorses mild pain with palpation. EOMI  - hold off on ENT consult at this time; monitor     Ppx:  SCDs    Code Status:  DNR/DNI.  No Surgery.  POA: Son, Paulo    Dispo:  PT/OT consult pending.  Anticipate SNF when medically ready.  Will continue to communicate with TCC    The patient was discussed with the attending surgeon NAY Maguire CNP  Trauma/General Surgery [facility]  Phone: [phone]        I spent 45 minutes in the professional and overall care of this patient.      NAY Carbajal-CNP  I saw and evaluated the patient.  I personally obtained the key and critical portions of the history and physical exam I was physically present for key and critical portions performed by the advanced practitioner.  I reviewed the advanced practitioner's documentation and discussed the patient with the advanced practitioner.  I agree with the advanced practitioner's medical decision making as documented in the advanced practitioner's  note.  Comments/Additional Findings:  I have personally seen and evaluated the patient with the advanced practitioner as above.  I discussed the findings with the patient's daughter.  We have discussed goals of care.  I agree with the decision to proceed only with nonoperative management.  Questions were answered.  Follow-up chest x-ray in a.m.

## 2023-11-20 NOTE — CONSULTS
"Inpatient consult to Medicine  Consult performed by: RICK Villanueva  Consult ordered by: RICK Carbajal  Reason for consult: medical management      History Of Present Illness  Ayse Cardoza is a 89 y.o. female who presented via EMS from [facility] after a fall. She was attempting to get up to the bathroom unassisted and fell, hitting her head on the bedside table. She sustained lacerations to her face and skin tear to left shoulder. Pt is A&O x2. She is unsure of loss of consciousness.  She is not on anticoagulants.  CT imaging revealed trace right SDH, L cheek laceration/contusion and presumed acute R 2-7 rib fractures. She was hypertensive to 223/100 and hypoxic to 85 - 87% on room air.  She was placed on 2 L of O2. She was placed on a Cardene drip and treated for her injuries and pain. Neurosurgery was not notified as she is DNR-CC. She was admitted to the trauma service. Son/POA was made aware of POC.     Review of Systems  Pt presently denies pain. She is oriented to self, location, city, state and year. She has no total recall of the fall, thinks it was \"outside\" and at another time, says she needs to ask her . She denies SOB but has right upper chest pain with deep inspiration. BP at bedside 114/57, Cardene gtt stopped 20 minutes earlier.    Past Medical History  She has a past medical history of Anemia, Asthma, Chronic hyponatremia, CKD (chronic kidney disease), Depression, HTN (hypertension), MCI (mild cognitive impairment), Polymyalgia rheumatica (CMS/[facility]), SDH (subdural hematoma) (CMS/[facility]) (11/20/2023), Stroke (cerebrum) (CMS/[facility]), and White coat syndrome with hypertension.    Surgical History  She has a past surgical history that includes Knee surgery (05/27/2014); Carpal tunnel release (05/27/2014); CT angio head w and wo IV contrast (12/19/2018); CT angio neck (12/19/2018); Lumbar laminectomy; and Total hip arthroplasty.    Social History  She reports that she has never " smoked. She has never used smokeless tobacco. She reports current alcohol use. She reports that she does not currently use drugs.    Family History  No family history on file.    Allergies  Fenofibrate, Statins-hmg-coa reductase inhibitors, Cefazolin, Gabapentin, Lisinopril, and Penicillins    Vitals:    11/20/23 0700   BP: (!) 194/87   Pulse: 67   Resp: (!) 28   SpO2: 100%       Vitals:    11/20/23 0556   Weight: 95.3 kg (210 lb)       Scheduled medications  acetaminophen, 975 mg, oral, q6h  gabapentin, 100 mg, oral, TID  lidocaine, 1 patch, transdermal, Daily      Continuous medications  niCARdipine, 2.5-15 mg/hr, Last Rate: 2.5 mg/hr (11/20/23 0937)      PRN medications  PRN medications: oxygen    Results for orders placed or performed during the hospital encounter of 11/20/23 (from the past 24 hour(s))   PST Top   Result Value Ref Range    Extra Tube Hold for add-ons.    CBC   Result Value Ref Range    WBC 13.4 (H) 4.4 - 11.3 x10*3/uL    nRBC 0.0 0.0 - 0.0 /100 WBCs    RBC 4.10 4.00 - 5.20 x10*6/uL    Hemoglobin 12.5 12.0 - 16.0 g/dL    Hematocrit 39.5 36.0 - 46.0 %    MCV 96 80 - 100 fL    MCH 30.5 26.0 - 34.0 pg    MCHC 31.6 (L) 32.0 - 36.0 g/dL    RDW 15.1 (H) 11.5 - 14.5 %    Platelets 209 150 - 450 x10*3/uL   Basic metabolic panel   Result Value Ref Range    Glucose 95 74 - 99 mg/dL    Sodium 133 (L) 136 - 145 mmol/L    Potassium 4.1 3.5 - 5.3 mmol/L    Chloride 100 98 - 107 mmol/L    Bicarbonate 24 21 - 32 mmol/L    Anion Gap 13 10 - 20 mmol/L    Urea Nitrogen 24 (H) 6 - 23 mg/dL    Creatinine 0.99 0.50 - 1.05 mg/dL    eGFR 55 (L) >60 mL/min/1.73m*2    Calcium 9.0 8.6 - 10.3 mg/dL   Creatine Kinase   Result Value Ref Range    Creatine Kinase 28 0 - 215 U/L       Constitutional: Well developed, awake, alert, oriented x2-3, no distress, cooperative, pleasant   Eyes: EOMI, clear sclera   ENMT: mucous membranes moist, no lesions seen   Head/Neck: Neck supple, no apparent injury, contusion over left cheek, L  periorbital ecchymosis, laceration and ecchymosis over R side of mouth   Respiratory/Thorax: CTAB, good chest expansion, respirations even and unlabored, pt on 3 lpm O2   Cardiovascular: Regular rate and rhythm, no murmurs/rubs/gallops, normal S1 and S 2   Gastrointestinal: Abdomen nondistended, soft, nontender,  hypoactive BS, no bruits   Musculoskeletal: ROM intact, no joint swelling, normal  strength   Extremities: no cyanosis, edema, contusions or clubbing, superficial lacerations and ecchymosis over L shoulder   Neurological: no focal deficit, pt alert and oriented x2-3   Psychological: Appropriate affect and behavior   Skin: Warm and dry, trauma as noted above       Assessment/Plan  Uncontrolled HTN     - hx of white-coat HTN, was hypertensive to 223/100      - was placed on a Cardene drip, just DC'ed, BP currently well controlled      - monitor BP on home meds  Multiple traumatic injuries 2/2 mechanical fall     - trace R SDH, facial lacerations/contusions, presumed acute R 2-7 rib fx      - neurosurgery not notified as pt is DNR-CC     - management per primary trauma service  Hypoxia     - pt hypoxic to 85 - 87% on room air, placed on O2 @ 3 lpm     - likely 2/2 multiple rib fractures, encourage incentive spirometry  Mild afebrile leukocytosis     - likely reactive, follow  Mild cognitive impairment     - pt A&O x2-3  Chronic hyponatremia, at baseline  CKD, at baseline  DVT ppx     - mechanical ppx given SDH      Kathi Fierro, CNP  Hospital Medicine

## 2023-11-20 NOTE — PROGRESS NOTES
Pharmacy Medication History Review    Ayse Cardoza is a 89 y.o. female admitted for Fall, initial encounter. Pharmacy reviewed the patient's [id number] medications and allergies for accuracy.    The list below reflectives the updated PTA list. Please review each medication in order reconciliation for additional clarification and justification.  (Not in a hospital admission)       The list below reflectives the updated allergy list. Please review each documented allergy for additional clarification and justification.  Allergies  Reviewed by Vanessa Posada CPhT on 11/20/2023        Severity Reactions Comments    Fenofibrate Medium Myalgia     Statins-hmg-coa Reductase Inhibitors Medium Myalgia     Cefazolin Low Rash     Gabapentin Low Other felt goofy on it    Lisinopril Low Cough     Penicillins Low Rash             Below are additional concerns with the patient's PTA list.    See PTA med list    Vanessa Posada CPhT

## 2023-11-21 ENCOUNTER — APPOINTMENT (OUTPATIENT)
Dept: RADIOLOGY | Facility: HOSPITAL | Age: 88
DRG: 082 | End: 2023-11-21
Payer: MEDICARE

## 2023-11-21 LAB
ANION GAP SERPL CALC-SCNC: 10 MMOL/L (ref 10–20)
BUN SERPL-MCNC: 28 MG/DL (ref 6–23)
CALCIUM SERPL-MCNC: 7.6 MG/DL (ref 8.6–10.3)
CHLORIDE SERPL-SCNC: 103 MMOL/L (ref 98–107)
CO2 SERPL-SCNC: 26 MMOL/L (ref 21–32)
CREAT SERPL-MCNC: 1.09 MG/DL (ref 0.5–1.05)
ERYTHROCYTE [DISTWIDTH] IN BLOOD BY AUTOMATED COUNT: 15.2 % (ref 11.5–14.5)
GFR SERPL CREATININE-BSD FRML MDRD: 49 ML/MIN/1.73M*2
GLUCOSE BLD MANUAL STRIP-MCNC: 96 MG/DL (ref 74–99)
GLUCOSE SERPL-MCNC: 80 MG/DL (ref 74–99)
HCT VFR BLD AUTO: 33.6 % (ref 36–46)
HGB BLD-MCNC: 10.5 G/DL (ref 12–16)
MCH RBC QN AUTO: 30.8 PG (ref 26–34)
MCHC RBC AUTO-ENTMCNC: 31.3 G/DL (ref 32–36)
MCV RBC AUTO: 99 FL (ref 80–100)
NRBC BLD-RTO: 0 /100 WBCS (ref 0–0)
PLATELET # BLD AUTO: 139 X10*3/UL (ref 150–450)
POTASSIUM SERPL-SCNC: 4 MMOL/L (ref 3.5–5.3)
RBC # BLD AUTO: 3.41 X10*6/UL (ref 4–5.2)
SODIUM SERPL-SCNC: 135 MMOL/L (ref 136–145)
WBC # BLD AUTO: 7.6 X10*3/UL (ref 4.4–11.3)

## 2023-11-21 PROCEDURE — 71045 X-RAY EXAM CHEST 1 VIEW: CPT | Performed by: RADIOLOGY

## 2023-11-21 PROCEDURE — 36415 COLL VENOUS BLD VENIPUNCTURE: CPT | Performed by: INTERNAL MEDICINE

## 2023-11-21 PROCEDURE — 99232 SBSQ HOSP IP/OBS MODERATE 35: CPT | Performed by: SURGERY

## 2023-11-21 PROCEDURE — 99232 SBSQ HOSP IP/OBS MODERATE 35: CPT | Performed by: INTERNAL MEDICINE

## 2023-11-21 PROCEDURE — 71045 X-RAY EXAM CHEST 1 VIEW: CPT | Mod: FY

## 2023-11-21 PROCEDURE — 99232 SBSQ HOSP IP/OBS MODERATE 35: CPT | Performed by: REGISTERED NURSE

## 2023-11-21 PROCEDURE — 2060000001 HC INTERMEDIATE ICU ROOM DAILY

## 2023-11-21 PROCEDURE — 80048 BASIC METABOLIC PNL TOTAL CA: CPT | Performed by: INTERNAL MEDICINE

## 2023-11-21 PROCEDURE — 2500000004 HC RX 250 GENERAL PHARMACY W/ HCPCS (ALT 636 FOR OP/ED): Performed by: REGISTERED NURSE

## 2023-11-21 PROCEDURE — 2500000005 HC RX 250 GENERAL PHARMACY W/O HCPCS: Performed by: PHYSICIAN ASSISTANT

## 2023-11-21 PROCEDURE — 82947 ASSAY GLUCOSE BLOOD QUANT: CPT

## 2023-11-21 PROCEDURE — 85027 COMPLETE CBC AUTOMATED: CPT | Performed by: INTERNAL MEDICINE

## 2023-11-21 PROCEDURE — 97165 OT EVAL LOW COMPLEX 30 MIN: CPT | Mod: GO

## 2023-11-21 PROCEDURE — 97161 PT EVAL LOW COMPLEX 20 MIN: CPT | Mod: GP

## 2023-11-21 PROCEDURE — 2500000004 HC RX 250 GENERAL PHARMACY W/ HCPCS (ALT 636 FOR OP/ED): Performed by: INTERNAL MEDICINE

## 2023-11-21 PROCEDURE — 2500000001 HC RX 250 WO HCPCS SELF ADMINISTERED DRUGS (ALT 637 FOR MEDICARE OP): Performed by: REGISTERED NURSE

## 2023-11-21 RX ORDER — HYDRALAZINE HYDROCHLORIDE 20 MG/ML
5 INJECTION INTRAMUSCULAR; INTRAVENOUS EVERY 4 HOURS PRN
Status: DISCONTINUED | OUTPATIENT
Start: 2023-11-21 | End: 2023-11-22 | Stop reason: HOSPADM

## 2023-11-21 RX ORDER — SODIUM CHLORIDE 9 MG/ML
100 INJECTION, SOLUTION INTRAVENOUS CONTINUOUS
Status: DISCONTINUED | OUTPATIENT
Start: 2023-11-21 | End: 2023-11-22

## 2023-11-21 RX ADMIN — ACETAMINOPHEN 975 MG: 325 TABLET ORAL at 10:58

## 2023-11-21 RX ADMIN — ACETAMINOPHEN 975 MG: 325 TABLET ORAL at 22:36

## 2023-11-21 RX ADMIN — LORATADINE 10 MG: 10 TABLET ORAL at 10:24

## 2023-11-21 RX ADMIN — SODIUM CHLORIDE 500 ML: 9 INJECTION, SOLUTION INTRAVENOUS at 04:36

## 2023-11-21 RX ADMIN — PANTOPRAZOLE SODIUM 40 MG: 40 TABLET, DELAYED RELEASE ORAL at 10:57

## 2023-11-21 RX ADMIN — GABAPENTIN 100 MG: 100 CAPSULE ORAL at 18:52

## 2023-11-21 RX ADMIN — POLYVINYL ALCOHOL, POVIDONE 1 DROP: 14; 6 SOLUTION/ DROPS OPHTHALMIC at 20:25

## 2023-11-21 RX ADMIN — TIMOLOL 1 DROP: 5.12 SOLUTION/ DROPS OPHTHALMIC at 09:00

## 2023-11-21 RX ADMIN — ACETAMINOPHEN 975 MG: 325 TABLET ORAL at 18:52

## 2023-11-21 RX ADMIN — CYCLOSPORINE 1 DROP: 0.5 EMULSION OPHTHALMIC at 23:10

## 2023-11-21 RX ADMIN — LIDOCAINE 1 PATCH: 4 PATCH TOPICAL at 10:26

## 2023-11-21 RX ADMIN — Medication 1 TABLET: at 10:23

## 2023-11-21 RX ADMIN — CYCLOSPORINE 1 DROP: 0.5 EMULSION OPHTHALMIC at 11:00

## 2023-11-21 RX ADMIN — ACETAMINOPHEN 975 MG: 325 TABLET ORAL at 05:14

## 2023-11-21 RX ADMIN — OXYBUTYNIN CHLORIDE 5 MG: 5 TABLET ORAL at 10:57

## 2023-11-21 RX ADMIN — LATANOPROST 1 DROP: 50 SOLUTION OPHTHALMIC at 20:24

## 2023-11-21 RX ADMIN — BRIMONIDINE TARTRATE 1 DROP: 2 SOLUTION/ DROPS OPHTHALMIC at 10:57

## 2023-11-21 RX ADMIN — BRIMONIDINE TARTRATE 1 DROP: 2 SOLUTION/ DROPS OPHTHALMIC at 20:25

## 2023-11-21 RX ADMIN — SODIUM CHLORIDE 250 ML: 9 INJECTION, SOLUTION INTRAVENOUS at 02:50

## 2023-11-21 RX ADMIN — POLYVINYL ALCOHOL, POVIDONE 1 DROP: 14; 6 SOLUTION/ DROPS OPHTHALMIC at 10:25

## 2023-11-21 RX ADMIN — OXYBUTYNIN CHLORIDE 5 MG: 5 TABLET ORAL at 18:53

## 2023-11-21 RX ADMIN — TIMOLOL 1 DROP: 5.12 SOLUTION/ DROPS OPHTHALMIC at 21:00

## 2023-11-21 RX ADMIN — SODIUM CHLORIDE 250 ML: 9 INJECTION, SOLUTION INTRAVENOUS at 00:42

## 2023-11-21 RX ADMIN — OXYBUTYNIN CHLORIDE 5 MG: 5 TABLET ORAL at 20:24

## 2023-11-21 RX ADMIN — SODIUM CHLORIDE 100 ML/HR: 9 INJECTION, SOLUTION INTRAVENOUS at 01:45

## 2023-11-21 RX ADMIN — CYANOCOBALAMIN TAB 500 MCG 500 MCG: 500 TAB at 10:25

## 2023-11-21 RX ADMIN — GABAPENTIN 100 MG: 100 CAPSULE ORAL at 10:24

## 2023-11-21 RX ADMIN — POLYETHYLENE GLYCOL 3350 17 G: 17 POWDER, FOR SOLUTION ORAL at 12:27

## 2023-11-21 RX ADMIN — GABAPENTIN 100 MG: 100 CAPSULE ORAL at 20:24

## 2023-11-21 RX ADMIN — SODIUM CHLORIDE 1 G: 1 TABLET ORAL at 10:57

## 2023-11-21 RX ADMIN — CITALOPRAM HYDROBROMIDE 20 MG: 20 TABLET ORAL at 10:24

## 2023-11-21 ASSESSMENT — PAIN SCALES - GENERAL
PAINLEVEL_OUTOF10: 4
PAINLEVEL_OUTOF10: 0 - NO PAIN

## 2023-11-21 ASSESSMENT — COGNITIVE AND FUNCTIONAL STATUS - GENERAL
DAILY ACTIVITIY SCORE: 18
MOVING TO AND FROM BED TO CHAIR: A LOT
EATING MEALS: A LITTLE
WALKING IN HOSPITAL ROOM: TOTAL
CLIMB 3 TO 5 STEPS WITH RAILING: A LOT
HELP NEEDED FOR BATHING: A LITTLE
DRESSING REGULAR LOWER BODY CLOTHING: A LITTLE
WALKING IN HOSPITAL ROOM: A LOT
HELP NEEDED FOR BATHING: A LOT
MOVING FROM LYING ON BACK TO SITTING ON SIDE OF FLAT BED WITH BEDRAILS: A LOT
DAILY ACTIVITIY SCORE: 12
CLIMB 3 TO 5 STEPS WITH RAILING: TOTAL
MOVING TO AND FROM BED TO CHAIR: A LOT
EATING MEALS: A LITTLE
TOILETING: A LITTLE
DRESSING REGULAR UPPER BODY CLOTHING: A LOT
STANDING UP FROM CHAIR USING ARMS: A LOT
TOILETING: TOTAL
MOBILITY SCORE: 9
DRESSING REGULAR LOWER BODY CLOTHING: TOTAL
STANDING UP FROM CHAIR USING ARMS: A LOT
PERSONAL GROOMING: A LITTLE
MOBILITY SCORE: 12
DRESSING REGULAR UPPER BODY CLOTHING: A LITTLE
PERSONAL GROOMING: A LITTLE
TURNING FROM BACK TO SIDE WHILE IN FLAT BAD: TOTAL
MOVING FROM LYING ON BACK TO SITTING ON SIDE OF FLAT BED WITH BEDRAILS: A LOT
TURNING FROM BACK TO SIDE WHILE IN FLAT BAD: A LOT

## 2023-11-21 ASSESSMENT — PAIN DESCRIPTION - ORIENTATION: ORIENTATION: RIGHT

## 2023-11-21 ASSESSMENT — ACTIVITIES OF DAILY LIVING (ADL): BATHING_ASSISTANCE: MAXIMAL

## 2023-11-21 ASSESSMENT — PAIN DESCRIPTION - LOCATION: LOCATION: CHEST

## 2023-11-21 NOTE — CONSULTS
Reason For Consult  Subdural hematoma (SDH)    History Of Present Illness  Ayse Cardoza is a 89 y.o. female presenting to [facility] ED on 11/20/2023, via EMS from [facility] facility after unwitnessed fall. Brain imaging in ED demonstrates right tentorial and right cerebral convexity SDH. She does not currently complain of headache.      Past Medical History  She has a past medical history of Anemia, Asthma, Chronic hyponatremia, CKD (chronic kidney disease), Depression, HTN (hypertension), MCI (mild cognitive impairment), Polymyalgia rheumatica (CMS/HCC), SDH (subdural hematoma) (CMS/HCC) (11/20/2023), Stroke (cerebrum) (CMS/[facility]), and White coat syndrome with hypertension. Reviewed    Surgical History  She has a past surgical history that includes Knee surgery (Bilateral, 05/27/2014); Carpal tunnel release (05/27/2014); CT angio head w and wo IV contrast (12/19/2018); CT angio neck (12/19/2018); Lumbar laminectomy; Total hip arthroplasty (Bilateral); and Appendectomy. Reviewed     Social History  She reports that she has never smoked. She has never used smokeless tobacco. She reports current alcohol use. She reports that she does not currently use drugs. Reviewed    Family History Reviewed  Family History   Problem Relation Name Age of Onset    Heart failure Mother      Asthma Mother      Coronary artery disease Brother          Allergies  Fenofibrate, Statins-hmg-coa reductase inhibitors, Cefazolin, Gabapentin, Lisinopril, and Penicillins    Review of Systems  ROS x 10 is, otherwise, negative unless documented above in HPI     Physical Exam  Elderly frail female, resting in bed, no apparent distress, speech is slow, clear, albeit brief.  Follows commands readily  Left facial ecchymoses noted, no facial droop  Thorax is midline, chest expansion symmetric  Abdomen is not distended  KELLEY in bed, equally.  is equal        Last Recorded Vitals  Blood pressure 110/50, pulse 57, temperature 36.3 °C  "(97.3 °F), temperature source Tympanic, resp. rate 20, height 1.651 m (5' 5\"), weight 95.3 kg (210 lb), SpO2 100 %.    Relevant Results  CT Head x 2 on 11/20/2023 - images reviewed: stable small SDH at right cerebral convexity and tentorium    CT C Spine on 11/20/2023: images reviewed: no acute fractures    Results for orders placed or performed during the hospital encounter of 11/20/23 (from the past 24 hour(s))   CBC   Result Value Ref Range    WBC 13.4 (H) 4.4 - 11.3 x10*3/uL    nRBC 0.0 0.0 - 0.0 /100 WBCs    RBC 4.10 4.00 - 5.20 x10*6/uL    Hemoglobin 12.5 12.0 - 16.0 g/dL    Hematocrit 39.5 36.0 - 46.0 %    MCV 96 80 - 100 fL    MCH 30.5 26.0 - 34.0 pg    MCHC 31.6 (L) 32.0 - 36.0 g/dL    RDW 15.1 (H) 11.5 - 14.5 %    Platelets 209 150 - 450 x10*3/uL   Basic metabolic panel   Result Value Ref Range    Glucose 95 74 - 99 mg/dL    Sodium 133 (L) 136 - 145 mmol/L    Potassium 4.1 3.5 - 5.3 mmol/L    Chloride 100 98 - 107 mmol/L    Bicarbonate 24 21 - 32 mmol/L    Anion Gap 13 10 - 20 mmol/L    Urea Nitrogen 24 (H) 6 - 23 mg/dL    Creatinine 0.99 0.50 - 1.05 mg/dL    eGFR 55 (L) >60 mL/min/1.73m*2    Calcium 9.0 8.6 - 10.3 mg/dL   Creatine Kinase   Result Value Ref Range    Creatine Kinase 28 0 - 215 U/L     Scheduled medications  acetaminophen, 975 mg, oral, q6h  brimonidine, 1 drop, Both Eyes, BID   And  timolol, 1 drop, Both Eyes, BID  calcium carbonate-vitamin D3, 1 tablet, oral, Daily  citalopram, 20 mg, oral, Daily  cyanocobalamin, 500 mcg, oral, Daily  cycloSPORINE, 1 drop, Both Eyes, q12h WYATT  gabapentin, 100 mg, oral, TID  [Held by provider] hydroCHLOROthiazide, 12.5 mg, oral, Daily  latanoprost, 1 drop, Both Eyes, Nightly  lidocaine, 1 patch, transdermal, Daily  loratadine, 10 mg, oral, Daily  lubricating eye drops, 1 drop, Both Eyes, BID  [Held by provider] meloxicam, 15 mg, oral, Daily  oxybutynin, 5 mg, oral, TID  pantoprazole, 40 mg, oral, Daily before breakfast  polyethylene glycol, 17 g, oral, " Daily  sodium chloride, 1 g, oral, Daily    Continuous medications  sodium chloride 0.9%, 100 mL/hr, Last Rate: 100 mL/hr (11/21/23 0145)    PRN medications  PRN medications: hydrALAZINE, ipratropium-albuteroL, moisturizing mouth, ondansetron **OR** ondansetron, oxygen         Assessment/Plan   Stable right SDH: non surgical  Discussed with Dr. Eric Kumar;  - Continue medical management with HOB > 30 degrees, SBP < 160, avoid anticoagulation  - Okay for PT / OT for discharge planning  - No need for further CT Head imaging unless change in mental status, c/o headache    I spent >35 minutes in the professional and overall care of this patient.    Jaqueline Conn, APRN-CNP

## 2023-11-21 NOTE — CARE PLAN
Problem: Fall/Injury  Goal: Not fall by end of shift  Outcome: Progressing  Goal: Be free from injury by end of the shift  Outcome: Progressing  Goal: Verbalize understanding of personal risk factors for fall in the hospital  Outcome: Progressing     Problem: Pain - Adult  Goal: Verbalizes/displays adequate comfort level or baseline comfort level  Outcome: Progressing     Problem: Safety - Adult  Goal: Free from fall injury  Outcome: Progressing     Problem: Chronic Conditions and Co-morbidities  Goal: Patient's chronic conditions and co-morbidity symptoms are monitored and maintained or improved  Outcome: Progressing       The clinical goals for the shift include improve BP, safety & comfort.

## 2023-11-21 NOTE — PROGRESS NOTES
Occupational Therapy    Evaluation    Patient Name: Ayse Cardoza  MRN: [id number]  Today's Date: 11/21/2023  Time Calculation  Start Time: 0948  Stop Time: 1025  Time Calculation (min): 37 min        Assessment:  End of Session Communication: Bedside nurse  End of Session Patient Position: Bed, 2 rail up  OT Assessment Results: Decreased ADL status, Decreased upper extremity strength, Decreased upper extremity range of motion, Decreased endurance, Decreased functional transfers    Plan:  Treatment Interventions: ADL retraining, Functional transfer training, Patient/family training  OT Frequency: 3 times per week  OT Discharge Recommendations: Moderate intensity level of continued care  OT - OK to Discharge: Yes (from an O.T. standpoint)      Subjective   Current Problem:  1. Fall, initial encounter        2. Subdural hematoma (CMS/HCC)        3. Closed fracture of multiple ribs of right side, initial encounter        4. Hypoxia          General:  General  Reason for Referral: OT eval & treat for ADL (R SDH, R rib fractures, sthma, acute pulmonary insufficiency, leukocytosis)  Referred By: Navarro  Past Medical History Relevant to Rehab: 11/20/23: fell, hit L cheek on bedside table (PMH: HTN, HLD, CAD, asthma, anemia, DVT, polymyalgia pheumatica, glaucoma, mild cognitive impairment, GERD)  Family/Caregiver Present:  (spouse and daughter present for eval)  Prior to Session Communication: Bedside nurse (Per RN, patient is medically stable for therapy eval)    Precautions:  Precautions Comment: HOB 45 degrees, OOB with assist, fall/safety, 1 L O2, purewick       Objective   Cognition:  Overall Cognitive Status:  (A & O x 2, not to year; poor historian;)           Home Living:  Lives With:  (Per daughter, patient & spouse live at [facility]; staff provides extensive assist all ADLs & transfers; w/c bound, nonambulatory. Wears depends. One person assist all pivot transfers.)    ADL:  Grooming Assistance: Moderate  Bathing  Assistance: Maximal  UE Dressing Assistance: Maximal  LE Dressing Assistance: Total  Toileting Assistance with Device: Total  ADL Comments: extensive assist all ADLs secondary to impaired UE ROM, impaired activity tolerance & impaired balance     Bed Mobility/Transfers: Bed Mobility  Bed Mobility:  (dependent supine <-> sit; max assist x 2 rolling side to side in bed)           Extremities:   RUE :  (AROM grossly 45 degrees shoulder flexion, WFL elbow-distally with arthritic changes noted; MMT not tested;  3/5)   LUE:  (AROM grossly 45 degrees shoulder flexion, WFL elbow-distally with arthritic changes noted; MMT not tested,  3/5)        Outcome Measures:[facility] Daily Activity  Putting on and taking off regular lower body clothing: Total  Bathing (including washing, rinsing, drying): A lot  Putting on and taking off regular upper body clothing: A lot  Toileting, which includes using toilet, bedpan or urinal: Total  Taking care of personal grooming such as brushing teeth: A little  Eating Meals: A little  Daily Activity - Total Score: 12        Education Documentation  ADL Training, taught by Isidra Goldman OT at 11/21/2023  2:09 PM.  Learner: Patient  Readiness: Acceptance  Method: Explanation  Response: Verbalizes Understanding, Needs Reinforcement      EDUCATION:  Education  Individual(s) Educated: Patient, Spouse  Education Provided:  (fall/safety)  Patient/Caregiver Demonstrated Understanding: yes    Goals:  Encounter Problems       Encounter Problems (Active)       OT Goals       Increase bed mobility to min assist with rails for safety (Progressing)       Start:  11/21/23    Expected End:  12/05/23            Increase functional transfers to/from bed, chair and commode to CGA with DME for safety (Progressing)       Start:  11/21/23    Expected End:  12/05/23            Tolerate 10 minutes UE ROM without weights to increase activity tolerance for ADLs (Progressing)       Start:  11/21/23    Expected End:   12/05/23            Increase UE bathing, dressing and grooming to min assist (Progressing)       Start:  11/21/23    Expected End:  12/05/23

## 2023-11-21 NOTE — PROGRESS NOTES
Ayse Cardoza is a 89 y.o. female on day 1 of admission presenting with SDH (subdural hematoma) (CMS/HCC).    Subjective   Patient A&O x3 today. Denies any pain at present. Still stating it is hard to take a deep breath. Denies feeling SOB.  Still fatigued this morning. Not hungry.     Hypotension overnight requiring a total of 1L NS bolus in divided doses. (SBP 80 --> 86 --> 90 --> 90 --> 125).  Hypotensive episodes were between Midnight and 4am. No tachycardia. No fever at this time.   She had not received any anti-HTN other than her brief time on Nicardipine gtt in ED.    Patient denies feeling lightheaded or dizzy this morning and vitals now stable. On mIVF. NC weaned to 1L    Objective     Physical Exam:  Constitutional:         Quite elderly and frail-appearing female patient with obvious facial ecchymosis resting/asleep initially in hospital bed.  No acute distress  HENT:      Improving pattern of ecchymosis to left cheek and periorbital ecchymosis on left, no periorbital edema.  Crepitus of left cheek has improved.  Jaw swelling significantly improved.  Very dry mucous membranes with enlarged right upper lip, cracked skin with dried blood, no active bleeding.  Teeth in place and and only in fair repair with some missing teeth.  Tongue dry  Eyes:      EOMI.  Sclera white.  Cardiovascular:      Sinus bradycardia 53 on telemetry.  S1-S2 regular.  2+ pulses.  No tenderness to palpation of anterior or lateral chest walls  Pulmonary:      Weaned to 1 L nasal cannula.  Only fair inspiratory effort with very shallow breaths.  Unable to perform incentive spirometer.  Breath sounds quite diminished with some scattered crackles in bases  Abdominal:      Abdomen round, nondistended, nontender to light or deep palpation.  Active bowel sounds  :     External female catheter in place with yellow urine in suction canister  Skin:     No pallor, ecchymosis to face as noted above.  Healing skin tear left shoulder and  "right elbow.  Ecchymosis right anterior shin with healed scab  Extremities:     Chronic discoloration/dusky appearance to anterior shins and cool feet.  Trace edema bilateral lower extremities  Neurological:      Alert and oriented x3 today-did need slight reminding of the exact year but knew time of year, where she was, and situation regarding hospitalization  Psychiatric:         Calm, no anxiety.  Still quite fatigued    Last Recorded Vitals  Blood pressure 125/59, pulse 55, temperature 36.4 °C (97.5 °F), resp. rate 20, height 1.651 m (5' 5\"), weight 95.3 kg (210 lb), SpO2 99 %.  Intake/Output last 3 Shifts:  I/O last 3 completed shifts:  In: 1330.4 (14 mL/kg) [I.V.:330.4 (3.5 mL/kg); IV Piggyback:1000]  Out: - (0 mL/kg)   Weight: 95.3 kg     Relevant Results  Scheduled medications  acetaminophen, 975 mg, oral, q6h  brimonidine, 1 drop, Both Eyes, BID   And  timolol, 1 drop, Both Eyes, BID  calcium carbonate-vitamin D3, 1 tablet, oral, Daily  citalopram, 20 mg, oral, Daily  cyanocobalamin, 500 mcg, oral, Daily  cycloSPORINE, 1 drop, Both Eyes, q12h WYATT  gabapentin, 100 mg, oral, TID  [Held by provider] hydroCHLOROthiazide, 12.5 mg, oral, Daily  latanoprost, 1 drop, Both Eyes, Nightly  lidocaine, 1 patch, transdermal, Daily  loratadine, 10 mg, oral, Daily  lubricating eye drops, 1 drop, Both Eyes, BID  [Held by provider] meloxicam, 15 mg, oral, Daily  oxybutynin, 5 mg, oral, TID  pantoprazole, 40 mg, oral, Daily before breakfast  polyethylene glycol, 17 g, oral, Daily  sodium chloride, 1 g, oral, Daily      Continuous medications  sodium chloride 0.9%, 100 mL/hr, Last Rate: 100 mL/hr (11/21/23 0145)      PRN medications  PRN medications: hydrALAZINE, ipratropium-albuteroL, moisturizing mouth, ondansetron **OR** ondansetron, oxygen    Results for orders placed or performed during the hospital encounter of 11/20/23 (from the past 24 hour(s))   Basic metabolic panel   Result Value Ref Range    Glucose 80 74 - 99 " mg/dL    Sodium 135 (L) 136 - 145 mmol/L    Potassium 4.0 3.5 - 5.3 mmol/L    Chloride 103 98 - 107 mmol/L    Bicarbonate 26 21 - 32 mmol/L    Anion Gap 10 10 - 20 mmol/L    Urea Nitrogen 28 (H) 6 - 23 mg/dL    Creatinine 1.09 (H) 0.50 - 1.05 mg/dL    eGFR 49 (L) >60 mL/min/1.73m*2    Calcium 7.6 (L) 8.6 - 10.3 mg/dL   CBC   Result Value Ref Range    WBC 7.6 4.4 - 11.3 x10*3/uL    nRBC 0.0 0.0 - 0.0 /100 WBCs    RBC 3.41 (L) 4.00 - 5.20 x10*6/uL    Hemoglobin 10.5 (L) 12.0 - 16.0 g/dL    Hematocrit 33.6 (L) 36.0 - 46.0 %    MCV 99 80 - 100 fL    MCH 30.8 26.0 - 34.0 pg    MCHC 31.3 (L) 32.0 - 36.0 g/dL    RDW 15.2 (H) 11.5 - 14.5 %    Platelets 139 (L) 150 - 450 x10*3/uL                Assessment/Plan   Principal Problem:    SDH (subdural hematoma) (CMS/HCC)  Active Problems:    Fall, initial encounter    Closed traumatic nondisplaced fracture of multiple ribs, initial encounter    89 year old female, resident of assisted living facility, with a past medical history significant for HTN, HLD, CAD, asthma, anemia, venous insuffiencey, h/o DVT (not on AC), arthritis, polymyalgia rheumatica, glaucoma, mild cogitative impairment, and GERD presented to [facility] ED on 11/20 after an unwitnessed fall. Admitted to Trauma service with consult to medicine and NSGY.     List of Clinically significant Injuries:  - Right 2-7 rib fractures - 1L NC requirement   - Right SDH (2-3mm) - stable on repeat Head CT  - ? Left lateral orbit injury     #right SDH  - patient's family declined any surgical intervention, even if indicated. Not currently indicated with stable exam and head CT  - no further head CT unless headache per NSGY  - HOB elevated > 30 degrees  - Goal SBP < 160   > PRN hydralazine 5mg for SBP > 160  - HOLD home PO BP med with hypotension overnight   - okay to continue diet; encourage PO intake   - NO anticoagulation or anti-platelets at this time  - avoid NSAIDs for now (hold home med); would be okay to resume at  discharge      #acute right sided rib fractures  #asthma   #acute pulmonary insuffiencey with O2 requirement (1L)  - PRN duonebs  - pain management:   > scheduled Tylenol, scheduled gabapentin, PRN Lidoderm patch    > hold off on opioids at this time given age / acceptable pain control   - IS every 1 hour   - follow up CXR today  - OOB      #h/o HTN   #asymptomatic hypotension overnight   - continue mIVF today, but low threshold to stop if BP stable given risk of volume overload to lungs   - HOLD home hydrochlorothiazide   - every 4 hour vitals     #h/o chronic hyponatremia - asymptomatic at this time. Improved with NS bolus and IVF  - daily BMP     #possible left lateral orbit injury  > patient denies any visual field deficits and only endorses mild pain with palpation. EOMI  - hold off on ENT consult at this time; monitor      Ppx:  SCDs, OOB   > avoid adding DVT chemoprophylaxis with SDH     Code Status:  DNR/DNI.  No Surgery.  POA: Son, Paulo (currently on vacation). Daughter here from [place]      Dispo:  PT/OT consult pending.     Patient is from [facility] and they may possibly be able to accommodate patient based on PT/OT recs - nearing weaning off O2.  Anticipate discharge in next 24-48 hours pending CXR/PT eval     The patient will be discussed with the attending surgeon Dr. Flako Briceño, NAY, CNP  Trauma/General Surgery [facility]  Phone: [phone]    I spent 20 minutes in the professional and overall care of this patient.  I saw and evaluated the patient.  I personally obtained the key and critical portions of the history and physical exam I was physically present for key and critical portions performed by the advanced practitioner.  I reviewed the advanced practitioner's documentation and discussed the patient with the advanced practitioner.  I agree with the advanced practitioner's medical decision making as documented in the advanced practitioner's note.  Comments/Additional Findings: I  personally saw and examined the patient with the advanced practitioner.  Findings as above.  Continue current care

## 2023-11-21 NOTE — PROGRESS NOTES
Spiritual Care Visit    Clinical Encounter Type  Visited With: Patient and family together  Routine Visit: Introduction  Continue Visiting: Yes    Christian Encounters  Christian Needs: Spiritual care brochure, Prayer      welcomed by patient and caregiver. Patient desired brief visit with focus on prayer.   honored prayer request and gave patient blessing.  Patient open to additional visits.

## 2023-11-21 NOTE — PROGRESS NOTES
Ayse Cardoza is a 89 y.o. female on day 1 of admission presenting with SDH (subdural hematoma) (CMS/HCC).    Subjective   Pt denies any headache, vision changes, nausea.  She is having some right sided rib pain with deep breaths.       Objective     Physical Exam  Vitals and nursing note reviewed.   Constitutional:       General: She is not in acute distress.     Appearance: Normal appearance.   HENT:      Head: Normocephalic.      Comments: Contusion left cheek, left periorbital ecchymoses      Right Ear: External ear normal.      Left Ear: External ear normal.      Mouth/Throat:      Mouth: Mucous membranes are moist.      Pharynx: Oropharynx is clear.      Comments: Laceration and bruising of lip  Eyes:      General: No scleral icterus.     Extraocular Movements: Extraocular movements intact.      Conjunctiva/sclera: Conjunctivae normal.      Pupils: Pupils are equal, round, and reactive to light.   Cardiovascular:      Rate and Rhythm: Normal rate and regular rhythm.      Pulses: Normal pulses.      Heart sounds: No murmur heard.  Pulmonary:      Effort: Pulmonary effort is normal. No respiratory distress.      Breath sounds: No wheezing, rhonchi or rales.   Abdominal:      General: Bowel sounds are normal. There is no distension.      Palpations: Abdomen is soft.      Tenderness: There is no abdominal tenderness. There is no guarding or rebound.   Musculoskeletal:         General: No swelling, tenderness or deformity.      Cervical back: Neck supple. No tenderness.   Skin:     General: Skin is warm and dry.      Coloration: Skin is not jaundiced.      Findings: Bruising present. No erythema.   Neurological:      General: No focal deficit present.      Mental Status: She is alert and oriented to person, place, and time.   Psychiatric:         Mood and Affect: Mood normal.         Behavior: Behavior normal.         Last Recorded Vitals  Blood pressure 131/60, pulse 52, temperature 36.2 °C (97.2 °F), resp.  "rate 20, height 1.651 m (5' 5\"), weight 95.3 kg (210 lb), SpO2 99 %.  Intake/Output last 3 Shifts:  I/O last 3 completed shifts:  In: 1330.4 (14 mL/kg) [I.V.:330.4 (3.5 mL/kg); IV Piggyback:1000]  Out: - (0 mL/kg)   Weight: 95.3 kg     Relevant Results           This patient currently has cardiac telemetry ordered; if you would like to modify or discontinue the telemetry order, click here to go to the orders activity to modify/discontinue the order.  Scheduled medications  acetaminophen, 975 mg, oral, q6h  brimonidine, 1 drop, Both Eyes, BID   And  timolol, 1 drop, Both Eyes, BID  calcium carbonate-vitamin D3, 1 tablet, oral, Daily  citalopram, 20 mg, oral, Daily  cyanocobalamin, 500 mcg, oral, Daily  cycloSPORINE, 1 drop, Both Eyes, q12h WYATT  gabapentin, 100 mg, oral, TID  [Held by provider] hydroCHLOROthiazide, 12.5 mg, oral, Daily  latanoprost, 1 drop, Both Eyes, Nightly  lidocaine, 1 patch, transdermal, Daily  loratadine, 10 mg, oral, Daily  lubricating eye drops, 1 drop, Both Eyes, BID  [Held by provider] meloxicam, 15 mg, oral, Daily  oxybutynin, 5 mg, oral, TID  pantoprazole, 40 mg, oral, Daily before breakfast  polyethylene glycol, 17 g, oral, Daily  sodium chloride, 1 g, oral, Daily      Continuous medications  sodium chloride 0.9%, 100 mL/hr, Last Rate: 100 mL/hr (11/21/23 0145)      PRN medications  PRN medications: hydrALAZINE, ipratropium-albuteroL, moisturizing mouth, ondansetron **OR** ondansetron, oxygen                Assessment/Plan   Principal Problem:    SDH (subdural hematoma) (CMS/[facility])  Active Problems:    Fall, initial encounter    Closed traumatic nondisplaced fracture of multiple ribs, initial encounter    # Mechanical fall  # Subdural hematoma, stable  # Facial lacerations and contusions  # Right rib fractures 2-7  # Hypertension, controlled  # Hypoxia  # Chronic hyponatremia    Blood pressures soft overnight, today BP is stable.    Hydrochlorothiazide currently held, reassess and resume " tmrw if no further low BP readings.  Pain control for rib fractures.  Bronchodilators ordered.  Encourage use of incentive spirometer and wean O2 as able.  PT and OT.  Low AMPAC score, anticipate patient will need to be placed.         I spent 25 minutes in the professional and overall care of this patient.      Jayson Ely, DO

## 2023-11-21 NOTE — PROGRESS NOTES
Physical Therapy    Physical Therapy Evaluation    Patient Name: Ayse Cardoza  MRN: [id number]  Today's Date: 11/21/2023   Time Calculation  Start Time: 0949  Stop Time: 1025  Time Calculation (min): 36 min    Assessment/Plan   PT Assessment  PT Assessment Results: Decreased strength, Decreased endurance, Impaired balance, Decreased mobility, Decreased safety awareness, Impaired hearing, Pain  Rehab Prognosis: Good  Evaluation/Treatment Tolerance: Patient limited by fatigue, Patient limited by pain  End of Session Communication: Bedside nurse  Assessment Comment: Continued skilled PT intervention indicated to facilitate increased strength, balance & fxl mobility  End of Session Patient Position: Bed, 2 rail up, Alarm on (hob elevated 45deg, heels off loaded)  IP OR SWING BED PT PLAN  Inpatient or Swing Bed: Inpatient  PT Plan  Treatment/Interventions: Bed mobility, Transfer training, Balance training, Therapeutic exercise, Therapeutic activity  PT Plan: Skilled PT  PT Frequency: 3 times per week  PT Discharge Recommendations: Moderate intensity level of continued care  PT - OK to Discharge: Yes (to next level of care when cleared by medical team)    Subjective     Current Problem:  Patient Active Problem List   Diagnosis    Fall, initial encounter    SDH (subdural hematoma) (CMS/HCC)    Closed traumatic nondisplaced fracture of multiple ribs, initial encounter       General Visit Information:  General  Reason for Referral: PT eval & treat/impaired mobility (11/20/23 fell & hit lt cheek on bedside table; ct: trace rt sdh, lt cheek laceration/contusion; ct chest: rt 2-7 rib fx's, trace pleural effusion; dx: rt sdh, rt rib fx's, asthma, acute pulmonary insufficiency, leukocytosis)  Referred By: Navarro  Past Medical History Relevant to Rehab: htn, hld, cad, asthma, anemia, venous insufficiency, dvt, arthritis, polymyalgia rheumatica, glaucoma, mild cognitive impairment, gerd  Family/Caregiver Present:  (spouse & dtr  "present & supportive, dtr provides prior level of function (upon permission of pt) due to pt's cognition)  Caregiver Feedback: Per conference w/ RN patient stable to participate in therapy  General Comment: Pleasant & cooperative,oriented to person, place, month; verbalizes fear of falling during transfer to edge of bed    Home Living:  Home Living  Lives With:  (w/ spouse at [facility] where she has resided ~3yrs; standard bed w/o rail)    Prior Level of Function:  Prior Function Per Pt/Caregiver Report  Level of [place]:  (No prior h/o falls, nonambulatory x~8months per dtr; assist x1 bed mobility & stand pivot transfers; assist to propel WC; \"a lot of help\" for adls, iadls managed by facility; assist for toileting, wears a brief)    Precautions:  Precautions  Precautions Comment: fall, alarm, IV, 1LO2, hob elevatged 45deg, purewick, rt rib fx's  General Assessments:       Static Sitting Balance  Static Sitting-Balance Support:  (sba sitting supported at edge of bed [id number], initial cues for positioning hands & feet for support)       Functional Assessments:     Bed Mobility  Bed Mobility:  (dependent supine<>sit, max assist x2 rolling rt/lt)  Transfers  Transfer:  (deferred, rt hand IV site bleeding, transferred back to bed to be addressed by RN)   Extremity/Trunk Assessments:     RLE   RLE :  (supine aarom wfl; strength: hip flexion 3/5, knee ext 3+/5, ankle df 3+/5)  LLE   LLE :  (guarded rom but attains wfl in supine, strength: hip flexion 2+/5, knee ext 3/5 ankle df 3+/5)    Outcome Measures:  [facility] Basic Mobility  Turning from your back to your side while in a flat bed without using bedrails: A lot  Moving from lying on your back to sitting on the side of a flat bed without using bedrails: Total  Moving to and from bed to chair (including a wheelchair): A lot  Standing up from a chair using your arms (e.g. wheelchair or bedside chair): A lot  To walk in hospital room: Total  Climbing 3-5 steps with " railing: Total  Basic Mobility - Total Score: 9   Goals:  Encounter Problems       Encounter Problems (Active)       PT Problem       bed mobility   (Progressing)       Start:  11/21/23    Expected End:  12/05/23       Min/mod assist x1 supine<>sit         transfers   (Progressing)       Start:  11/21/23    Expected End:  12/05/23       Min/mod assist x1 pivot transfers bed<>chair          balance   (Progressing)       Start:  11/21/23    Expected End:  12/05/23       Sba maintaining sitting supported balance while performing >=10reps ble therex to facilitate inc fxl mobility            Pain - Adult            Education Documentation  Mobility Training, taught by Roxie Joel, PT at 11/21/2023  2:18 PM.  Learner: Family, Significant Other, Patient  Readiness: Acceptance  Method: Explanation  Response: Verbalizes Understanding, Needs Reinforcement  Comment: safety, activity progression, le rom

## 2023-11-22 VITALS
SYSTOLIC BLOOD PRESSURE: 115 MMHG | WEIGHT: 210 LBS | DIASTOLIC BLOOD PRESSURE: 55 MMHG | HEIGHT: 65 IN | TEMPERATURE: 96.3 F | HEART RATE: 52 BPM | OXYGEN SATURATION: 94 % | RESPIRATION RATE: 20 BRPM | BODY MASS INDEX: 34.99 KG/M2

## 2023-11-22 PROBLEM — S22.41XA MULTIPLE CLOSED FRACTURES OF RIBS OF RIGHT SIDE: Status: ACTIVE | Noted: 2023-11-22
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
## 2023-11-22 PROCEDURE — 94640 AIRWAY INHALATION TREATMENT: CPT

## 2023-11-22 PROCEDURE — 99238 HOSP IP/OBS DSCHRG MGMT 30/<: CPT | Performed by: SURGERY

## 2023-11-22 PROCEDURE — 2500000005 HC RX 250 GENERAL PHARMACY W/O HCPCS: Performed by: PHYSICIAN ASSISTANT

## 2023-11-22 PROCEDURE — 2500000001 HC RX 250 WO HCPCS SELF ADMINISTERED DRUGS (ALT 637 FOR MEDICARE OP): Performed by: REGISTERED NURSE

## 2023-11-22 PROCEDURE — 99239 HOSP IP/OBS DSCHRG MGMT >30: CPT | Performed by: REGISTERED NURSE

## 2023-11-22 PROCEDURE — 2500000004 HC RX 250 GENERAL PHARMACY W/ HCPCS (ALT 636 FOR OP/ED): Performed by: INTERNAL MEDICINE

## 2023-11-22 PROCEDURE — 2500000004 HC RX 250 GENERAL PHARMACY W/ HCPCS (ALT 636 FOR OP/ED): Performed by: REGISTERED NURSE

## 2023-11-22 PROCEDURE — 2500000002 HC RX 250 W HCPCS SELF ADMINISTERED DRUGS (ALT 637 FOR MEDICARE OP, ALT 636 FOR OP/ED): Performed by: REGISTERED NURSE

## 2023-11-22 PROCEDURE — 99232 SBSQ HOSP IP/OBS MODERATE 35: CPT | Performed by: INTERNAL MEDICINE

## 2023-11-22 RX ORDER — LIDOCAINE 560 MG/1
1 PATCH PERCUTANEOUS; TOPICAL; TRANSDERMAL DAILY
Qty: 30 PATCH | Refills: 0 | Status: SHIPPED | OUTPATIENT
Start: 2023-11-23

## 2023-11-22 RX ORDER — MELOXICAM 15 MG/1
15 TABLET ORAL DAILY
Start: 2023-11-22

## 2023-11-22 RX ORDER — IPRATROPIUM BROMIDE AND ALBUTEROL SULFATE 2.5; .5 MG/3ML; MG/3ML
3 SOLUTION RESPIRATORY (INHALATION) EVERY 4 HOURS PRN
Qty: 180 ML | Refills: 11 | Status: SHIPPED | OUTPATIENT
Start: 2023-11-22

## 2023-11-22 RX ADMIN — POLYETHYLENE GLYCOL 3350 17 G: 17 POWDER, FOR SOLUTION ORAL at 08:04

## 2023-11-22 RX ADMIN — ACETAMINOPHEN 975 MG: 325 TABLET ORAL at 13:05

## 2023-11-22 RX ADMIN — IPRATROPIUM BROMIDE AND ALBUTEROL SULFATE 3 ML: .5; 3 SOLUTION RESPIRATORY (INHALATION) at 09:15

## 2023-11-22 RX ADMIN — LIDOCAINE 1 PATCH: 4 PATCH TOPICAL at 10:34

## 2023-11-22 RX ADMIN — PANTOPRAZOLE SODIUM 40 MG: 40 TABLET, DELAYED RELEASE ORAL at 06:06

## 2023-11-22 RX ADMIN — LORATADINE 10 MG: 10 TABLET ORAL at 08:03

## 2023-11-22 RX ADMIN — CITALOPRAM HYDROBROMIDE 20 MG: 20 TABLET ORAL at 08:04

## 2023-11-22 RX ADMIN — OXYBUTYNIN CHLORIDE 5 MG: 5 TABLET ORAL at 08:03

## 2023-11-22 RX ADMIN — CYANOCOBALAMIN TAB 500 MCG 500 MCG: 500 TAB at 08:04

## 2023-11-22 RX ADMIN — POLYVINYL ALCOHOL, POVIDONE 1 DROP: 14; 6 SOLUTION/ DROPS OPHTHALMIC at 09:00

## 2023-11-22 RX ADMIN — CYCLOSPORINE 1 DROP: 0.5 EMULSION OPHTHALMIC at 10:36

## 2023-11-22 RX ADMIN — TIMOLOL 1 DROP: 5.12 SOLUTION/ DROPS OPHTHALMIC at 09:00

## 2023-11-22 RX ADMIN — ACETAMINOPHEN 975 MG: 325 TABLET ORAL at 04:07

## 2023-11-22 RX ADMIN — BRIMONIDINE TARTRATE 1 DROP: 2 SOLUTION/ DROPS OPHTHALMIC at 10:32

## 2023-11-22 RX ADMIN — SODIUM CHLORIDE 100 ML/HR: 9 INJECTION, SOLUTION INTRAVENOUS at 06:07

## 2023-11-22 RX ADMIN — HYDROCHLOROTHIAZIDE 12.5 MG: 12.5 TABLET ORAL at 10:36

## 2023-11-22 RX ADMIN — SODIUM CHLORIDE 1 G: 1 TABLET ORAL at 08:03

## 2023-11-22 RX ADMIN — GABAPENTIN 100 MG: 100 CAPSULE ORAL at 08:03

## 2023-11-22 RX ADMIN — Medication 1 TABLET: at 08:04

## 2023-11-22 ASSESSMENT — COGNITIVE AND FUNCTIONAL STATUS - GENERAL
CLIMB 3 TO 5 STEPS WITH RAILING: A LOT
WALKING IN HOSPITAL ROOM: A LOT
MOVING TO AND FROM BED TO CHAIR: A LOT
MOVING FROM LYING ON BACK TO SITTING ON SIDE OF FLAT BED WITH BEDRAILS: A LOT
DRESSING REGULAR UPPER BODY CLOTHING: A LITTLE
MOBILITY SCORE: 12
TOILETING: A LITTLE
EATING MEALS: A LITTLE
PERSONAL GROOMING: A LITTLE
STANDING UP FROM CHAIR USING ARMS: A LOT
HELP NEEDED FOR BATHING: A LITTLE
DAILY ACTIVITIY SCORE: 18
DRESSING REGULAR LOWER BODY CLOTHING: A LITTLE
TURNING FROM BACK TO SIDE WHILE IN FLAT BAD: A LOT

## 2023-11-22 NOTE — PROGRESS NOTES
11/22/23 11:19  Faxed over all the orders to [facility] at requested fax number of [phone].   Katlin Lim RN, BSN, [place]/ [employer] CT Supervisor     11/22/23 1100  Spoke with DON at [facility] and confirmed that pt can come back. Pt is okay to return. They want an RN report and want PT/OT orders included on discharge orders. Report number given to RN and notified RN that transport will need setup. Updated son Paulo. Confirmed with [facility] that meds on discharge orders is enough for facility to fill meds.   Katlin Lim RN, BSN, [place]/ [employer] CT Supervisor     11/22/23 0913  Called [facility] [phone]. Spoke with nurse who states that pt is basically w/c bound at baseline, but has to wait until DON gets out of meeting before confirming can come back. Provided this CT supervisor call back number. Spoke with son, Paulo, and Paulo wants pt to go back with with [facility] resumption if facility will take back. Transport will need set up for transportation. Also, explained IMM to Paulo. Will sign when comes in around noon. Awaiting call back from RALPH at [facility]  Katlin Lim RN, CLINTN, [place]/ [employer] CT Supervisor

## 2023-11-22 NOTE — DISCHARGE SUMMARY
Discharge Diagnosis  Multiple closed fractures of ribs of right side    Issues Requiring Follow-Up  Patient should have HHC resumed after discharge. Going back to [facility] with previous HHC agency.   MADELINN Albina sent at discharge along with new Rx for Lidocaine patch. Patient had been receiving Gabapentin while inpatient, but this was not prescribed at discharge due to history of intolerance.     Test Results Pending At Discharge  Pending Labs       No current pending labs.            Hospital Course   89 year old female, resident of assisted living facility, with a past medical history significant for HTN, HLD, CAD, asthma, anemia, venous insuffiencey, h/o DVT (not on AC), arthritis, polymyalgia rheumatica, glaucoma, mild cogitative impairment, and GERD presented to [facility] ED on 11/20 after an unwitnessed fall. Admitted to Trauma service with consult to medicine and NSGY.     Patient sustained acute fractures to her Right 2-7th ribs.  She also was found to have a small Right subdural hematoma (2-3mm) that was stable on repeat CT scan.    Patient instructed to avoid all blood thinners including Aspirin as well as NSAIDs for at least 2 weeks and follow up as needed with neurosurgery as an outpatient (Jaqueline Conn) since family declined any surgical intervention during hospital stay.    Patient was monitored on telemetry floor. Requiring IVF for the first 24 hours due to soft BP after receiving home anti-HTN medications. By day of discharge, home PO meds were resumed and SBP was in 140s-150s.    On day of discharge all vital signs, laboratory data, and physical exam findings were reviewed and patient was deemed appropriate for hospital discharge. At the time of discharge, patient's pain was controlled with oral analgesia, patient was urinating, having BMs, sleeping, and eating well. Based on PT/OT's recommendation, patient was discharged back to [facility] living with resumption of her HHC. [facility] director of  "nursing aware that patient's AM-PAC score was 9. Patient was sent with scripts for breathing treatments and lidocaine.        Pertinent Physical Exam At Time of Discharge  Constitutional:         Elderly female patient resting in hospital bed after finishing half her breakfast, asking to be taken \"upstairs\" to her room.  HENT:      Improving pattern of ecchymosis to left cheek and periorbital ecchymosis on left, no periorbital edema. Soft tissue around left jaw -swelling significantly improved.  Very dry mucous membranes with enlarged right upper lip, cracked skin with dried blood, no active bleeding - also improving in size of swelling/amount of ecchymosis. Teeth in place and and only in fair repair with some missing teeth.  Tongue dry  Eyes:      EOMI.  Sclera white.  Cardiovascular:      NSR 65 on telemetry.  S1-S2 regular.  2+ pulses.  No tenderness to palpation of anterior or lateral chest walls  Pulmonary:      1-2 L nasal cannula.  Only fair inspiratory effort with very shallow breaths.  Pulled about 300 on IS with fair effort. Crackles - faint, fine bilateral lower lobes. Slightly decreased air entry right side, but upper airway with clear sounds and trachea midline. Expiratory wheezing with \"squeaking\" sound on exhalation. No stridor auscultated.  Wheeze improved s/p breathing treatment.   Abdominal:      Abdomen round, nondistended, nontender to light or deep palpation.  Active bowel sounds  :     External female catheter in place with yellow urine in suction canister  Skin:     No pallor, ecchymosis to face as noted above.  Healing skin tear left shoulder and right elbow.  Ecchymosis right anterior shin with healed scab  Extremities:     Chronic discoloration/dusky appearance to anterior shins and cool feet.  Trace edema bilateral lower extremities  Neurological:      Alert and oriented x3 today-did need slight reminding of the exact year but knew time of year, where she was. Slightly confused and " asking to go to her room, but realized she was in the hospital   Psychiatric:         Calm, no anxiety. Less fatigued and more alert today.    Home Medications     Medication List      START taking these medications     lidocaine 4 % patch; Place 1 patch over 12 hours on the skin once daily.   Remove & discard patch within 12 hours or as directed by MD. Do not start   before November 23, 2023.; Start taking on: November 23, 2023   oxygen gas therapy; Commonly known as: O2; Inhale 1 each once every 24   hours.     CHANGE how you take these medications     meloxicam 15 mg tablet; Commonly known as: Mobic; Take 1 tablet (15 mg)   by mouth once daily. Wait at least one more week before resuming this   medication as it can increase the risk of bleeding; What changed:   additional instructions     CONTINUE taking these medications     acetaminophen 325 mg tablet; Commonly known as: Tylenol   alendronate 70 mg tablet; Commonly known as: Fosamax   Biotene Dry Mouth Oral Rinse solution; Generic drug: moisturizing mouth   brimonidine-timoloL 0.2-0.5 % ophthalmic solution; Commonly known as:   Combigan   calcium carbonate-vitamin D3 500 mg-5 mcg (200 unit) tablet   carboxymethylcellulose 0.5 % ophthalmic solution; Commonly known as:   Refresh Plus   cetirizine 10 mg tablet; Commonly known as: ZyrTEC   citalopram 20 mg tablet; Commonly known as: CeleXA   cyanocobalamin 500 mcg tablet; Commonly known as: Vitamin B-12   diclofenac sodium 1 % gel gel; Commonly known as: Voltaren   fluticasone 50 mcg/actuation nasal spray; Commonly known as: Flonase   hydroCHLOROthiazide 12.5 mg tablet; Commonly known as: HYDRODiuril   latanoprost 0.005 % ophthalmic solution; Commonly known as: Xalatan   mirabegron 50 mg tablet extended release 24 hr 24 hr tablet; Commonly   known as: Myrbetriq   omeprazole 20 mg DR capsule; Commonly known as: PriLOSEC   Restasis 0.05 % ophthalmic emulsion; Generic drug: cycloSPORINE   sodium chloride 1,000 mg  tablet     I saw and evaluated the patient.  I personally obtained the key and critical portions of the history and physical exam I was physically present for key and critical portions performed by the advanced practitioner.  I reviewed the advanced practitioner's documentation and discussed the patient with the advanced practitioner.  I agree with the advanced practitioner's medical decision making as documented in the advanced practitioner's note.  Comments/Additional Findings: Satisfactory hospital course.    Outpatient Follow-Up  No future appointments.    Valentine Briceño, APRN-CNP

## 2023-11-22 NOTE — PROGRESS NOTES
Ayse Cardoza is a 89 y.o. female on day 2 of admission presenting with SDH (subdural hematoma) (CMS/HCC).    Subjective   Pt denies any headaches, vision changes.  She denies any shortness of breath or choking sensation.        Objective     Physical Exam  Vitals and nursing note reviewed.   Constitutional:       General: She is not in acute distress.     Appearance: Normal appearance.   HENT:      Head: Normocephalic.      Comments: Contusion left cheek, left periorbital ecchymoses      Right Ear: External ear normal.      Left Ear: External ear normal.      Mouth/Throat:      Mouth: Mucous membranes are moist.      Pharynx: Oropharynx is clear.      Comments: Laceration and bruising of lip  Eyes:      General: No scleral icterus.     Extraocular Movements: Extraocular movements intact.      Conjunctiva/sclera: Conjunctivae normal.      Pupils: Pupils are equal, round, and reactive to light.   Cardiovascular:      Rate and Rhythm: Normal rate and regular rhythm.      Pulses: Normal pulses.      Heart sounds: No murmur heard.  Pulmonary:      Effort: Pulmonary effort is normal. No respiratory distress.      Breath sounds: No wheezing, rhonchi or rales.   Abdominal:      General: Bowel sounds are normal. There is no distension.      Palpations: Abdomen is soft.      Tenderness: There is no abdominal tenderness. There is no guarding or rebound.   Musculoskeletal:         General: Tenderness (ribs) present. No swelling or deformity.      Cervical back: Neck supple. No tenderness.   Skin:     General: Skin is warm and dry.      Coloration: Skin is not jaundiced.      Findings: Bruising present. No erythema.   Neurological:      General: No focal deficit present.      Mental Status: She is alert and oriented to person, place, and time.   Psychiatric:         Mood and Affect: Mood normal.         Behavior: Behavior normal.         Last Recorded Vitals  Blood pressure (!) 188/86, pulse 64, temperature 35.7 °C (96.3  "°F), temperature source Temporal, resp. rate 20, height 1.651 m (5' 5\"), weight 95.3 kg (210 lb), SpO2 99 %.  Intake/Output last 3 Shifts:  I/O last 3 completed shifts:  In: 3716.7 (39 mL/kg) [I.V.:2716.7 (28.5 mL/kg); IV Piggyback:1000]  Out: 600 (6.3 mL/kg) [Urine:600 (0.2 mL/kg/hr)]  Weight: 95.3 kg     Relevant Results           This patient currently has cardiac telemetry ordered; if you would like to modify or discontinue the telemetry order, click here to go to the orders activity to modify/discontinue the order.                 Assessment/Plan   Principal Problem:    SDH (subdural hematoma) (CMS/HCC)  Active Problems:    Fall, initial encounter    Closed traumatic nondisplaced fracture of multiple ribs, initial encounter    # Mechanical fall  # Subdural hematoma, stable  # Facial lacerations and contusions  # Right rib fractures 2-7  # Hypertension, controlled  # Hypoxia  # Chronic hyponatremia    Can resume hydrochlorothiazide 12.5mg daily.  Hydralazine PRN elevated BP readings.  She is weaned to room air.  Denies any shortness of breath or cough.  Encourage use of incentive spirometer.  Low AMPAC score, anticipate patient will need skilled at discharge if patient and family are agreeable.         I spent 25 minutes in the professional and overall care of this patient.      Jayson Ely, DO      "

## 2023-11-22 NOTE — CARE PLAN
The patient's goals for the shift include    Problem: Fall/Injury  Goal: Not fall by end of shift  Outcome: Progressing  Goal: Be free from injury by end of the shift  Outcome: Progressing  Goal: Verbalize understanding of personal risk factors for fall in the hospital  Outcome: Progressing  Goal: Verbalize understanding of risk factor reduction measures to prevent injury from fall in the home  Outcome: Progressing       The clinical goals for the shift include comfort and rest

## 2023-11-23 NOTE — DOCUMENTATION CLARIFICATION NOTE
"    PATIENT:               LIBRA ORO  ACCT #:                  [id number]  MRN:                       [id number]  :                       1934  ADMIT DATE:       2023 5:37 AM  DISCH DATE:        2023 2:45 PM  RESPONDING PROVIDER #:        [id number]          PROVIDER RESPONSE TEXT:    Acute Hypoxemic Respiratory Failure    CDI QUERY TEXT:    [facility]_Respiratory Failure Acute    Instruction:    Based on your assessment of the patient and the clinical information, please provide the requested documentation by clicking on the appropriate radio button and enter any additional information if prompted.    Question: Is there a diagnosis indicative of the clinical information    When answering this query, please exercise your independent professional judgment. The fact that a question is being asked, does not imply that any particular answer is desired or expected.    The patient's clinical indicators include:  Clinical Information: 89 yo admitted s/p fall sustaining small SDH and multiple rib fractures    Clinical Indicators: ED  \"Slight expiratory wheeze on the right\"    23 Consult Ganelli  \"pt hypoxic to 85 - 87\" percent on room air, placed on O2 at 3 lpm, \"right upper chest pain with deep inspiration\"    23 at 0600  SpO2 87  RR 28  23 at 0645  SpO2 82  RR 30    23 at 2006 nursing flow sheet \"Diminished\" bilateral breath sounds    23 Chest Xray  \"Patchy airspace opacities in both lungs developed in the interval  from the prior study suggestive of multifocal pneumonia\"    23 Chest Xray \"Coarse prominent interstitial markings is probably due to fibrosis,  with suspected superimposed atelectasis or infiltrate at the midlung  regions greater on the right, which appears slightly improved\"    Treatment:  Supplemental oxygen 1-3 ltr/min, SpO2 monitoring, Chest Xrays x 2    Risk Factors:  89 yo with multiple rib fractures, chest Xray showing pneumonia/atelectasis  Options " provided:  -- Acute Hypoxemic Respiratory Failure  -- No acute respiratory failure  -- Other - I will add my own diagnosis  -- Refer to Clinical Documentation Reviewer    Query created by: Jaqueline Newton on 11/22/2023 6:47 AM      Electronically signed by:  BAHMAN ZENDEJAS DO 11/23/2023 10:40 AM

## 2023-11-24 ENCOUNTER — HOSPITAL ENCOUNTER (INPATIENT)
Facility: HOSPITAL | Age: 88
LOS: 6 days | Discharge: NURSING, REHAB OR LONG-TERM CARE | DRG: 177 | End: 2023-11-30
Attending: STUDENT IN AN ORGANIZED HEALTH CARE EDUCATION/TRAINING PROGRAM | Admitting: INTERNAL MEDICINE
Payer: MEDICARE

## 2023-11-24 ENCOUNTER — APPOINTMENT (OUTPATIENT)
Dept: RADIOLOGY | Facility: HOSPITAL | Age: 88
DRG: 177 | End: 2023-11-24
Payer: MEDICARE

## 2023-11-24 DIAGNOSIS — R44.3 HALLUCINATION: ICD-10-CM

## 2023-11-24 DIAGNOSIS — R29.6 MULTIPLE FALLS: Primary | ICD-10-CM

## 2023-11-24 DIAGNOSIS — J18.9 PNEUMONIA OF RIGHT LUNG DUE TO INFECTIOUS ORGANISM, UNSPECIFIED PART OF LUNG: ICD-10-CM

## 2023-11-24 DIAGNOSIS — I10 PRIMARY HYPERTENSION: ICD-10-CM

## 2023-11-24 DIAGNOSIS — Z86.79 HISTORY OF INTRACRANIAL HEMORRHAGE: ICD-10-CM

## 2023-11-24 DIAGNOSIS — Z87.81 HISTORY OF RIB FRACTURE: ICD-10-CM

## 2023-11-24 LAB
ALBUMIN SERPL BCP-MCNC: 3.4 G/DL (ref 3.4–5)
ALP SERPL-CCNC: 107 U/L (ref 33–136)
ALT SERPL W P-5'-P-CCNC: 17 U/L (ref 7–45)
ANION GAP SERPL CALC-SCNC: 12 MMOL/L (ref 10–20)
AST SERPL W P-5'-P-CCNC: 31 U/L (ref 9–39)
BASOPHILS # BLD AUTO: 0.05 X10*3/UL (ref 0–0.1)
BASOPHILS NFR BLD AUTO: 0.4 %
BILIRUB SERPL-MCNC: 1.1 MG/DL (ref 0–1.2)
BUN SERPL-MCNC: 31 MG/DL (ref 6–23)
CALCIUM SERPL-MCNC: 9.2 MG/DL (ref 8.6–10.3)
CHLORIDE SERPL-SCNC: 100 MMOL/L (ref 98–107)
CO2 SERPL-SCNC: 25 MMOL/L (ref 21–32)
CREAT SERPL-MCNC: 1.03 MG/DL (ref 0.5–1.05)
EOSINOPHIL # BLD AUTO: 0.28 X10*3/UL (ref 0–0.4)
EOSINOPHIL NFR BLD AUTO: 2.2 %
ERYTHROCYTE [DISTWIDTH] IN BLOOD BY AUTOMATED COUNT: 15.4 % (ref 11.5–14.5)
GFR SERPL CREATININE-BSD FRML MDRD: 52 ML/MIN/1.73M*2
GLUCOSE SERPL-MCNC: 91 MG/DL (ref 74–99)
HCT VFR BLD AUTO: 35.7 % (ref 36–46)
HGB BLD-MCNC: 11.3 G/DL (ref 12–16)
HOLD SPECIMEN: NORMAL
IMM GRANULOCYTES # BLD AUTO: 0.06 X10*3/UL (ref 0–0.5)
IMM GRANULOCYTES NFR BLD AUTO: 0.5 % (ref 0–0.9)
LYMPHOCYTES # BLD AUTO: 1.19 X10*3/UL (ref 0.8–3)
LYMPHOCYTES NFR BLD AUTO: 9.4 %
MCH RBC QN AUTO: 30.4 PG (ref 26–34)
MCHC RBC AUTO-ENTMCNC: 31.7 G/DL (ref 32–36)
MCV RBC AUTO: 96 FL (ref 80–100)
MONOCYTES # BLD AUTO: 1.33 X10*3/UL (ref 0.05–0.8)
MONOCYTES NFR BLD AUTO: 10.5 %
NEUTROPHILS # BLD AUTO: 9.79 X10*3/UL (ref 1.6–5.5)
NEUTROPHILS NFR BLD AUTO: 77 %
NRBC BLD-RTO: 0 /100 WBCS (ref 0–0)
PHOSPHATE SERPL-MCNC: 3 MG/DL (ref 2.5–4.9)
PLATELET # BLD AUTO: 175 X10*3/UL (ref 150–450)
POTASSIUM SERPL-SCNC: 4 MMOL/L (ref 3.5–5.3)
PROT SERPL-MCNC: 6.6 G/DL (ref 6.4–8.2)
RBC # BLD AUTO: 3.72 X10*6/UL (ref 4–5.2)
SODIUM SERPL-SCNC: 133 MMOL/L (ref 136–145)
WBC # BLD AUTO: 12.7 X10*3/UL (ref 4.4–11.3)

## 2023-11-24 PROCEDURE — 2500000004 HC RX 250 GENERAL PHARMACY W/ HCPCS (ALT 636 FOR OP/ED): Performed by: PHYSICIAN ASSISTANT

## 2023-11-24 PROCEDURE — 84100 ASSAY OF PHOSPHORUS: CPT

## 2023-11-24 PROCEDURE — 99222 1ST HOSP IP/OBS MODERATE 55: CPT | Performed by: INTERNAL MEDICINE

## 2023-11-24 PROCEDURE — 2500000002 HC RX 250 W HCPCS SELF ADMINISTERED DRUGS (ALT 637 FOR MEDICARE OP, ALT 636 FOR OP/ED): Performed by: PHYSICIAN ASSISTANT

## 2023-11-24 PROCEDURE — 96374 THER/PROPH/DIAG INJ IV PUSH: CPT

## 2023-11-24 PROCEDURE — 94640 AIRWAY INHALATION TREATMENT: CPT

## 2023-11-24 PROCEDURE — 71045 X-RAY EXAM CHEST 1 VIEW: CPT | Mod: FY

## 2023-11-24 PROCEDURE — 2500000001 HC RX 250 WO HCPCS SELF ADMINISTERED DRUGS (ALT 637 FOR MEDICARE OP)

## 2023-11-24 PROCEDURE — 99285 EMERGENCY DEPT VISIT HI MDM: CPT | Mod: 25,27 | Performed by: STUDENT IN AN ORGANIZED HEALTH CARE EDUCATION/TRAINING PROGRAM

## 2023-11-24 PROCEDURE — 36415 COLL VENOUS BLD VENIPUNCTURE: CPT | Mod: PARLAB

## 2023-11-24 PROCEDURE — 94760 N-INVAS EAR/PLS OXIMETRY 1: CPT

## 2023-11-24 PROCEDURE — 80053 COMPREHEN METABOLIC PANEL: CPT | Performed by: PHYSICIAN ASSISTANT

## 2023-11-24 PROCEDURE — 36415 COLL VENOUS BLD VENIPUNCTURE: CPT

## 2023-11-24 PROCEDURE — 87081 CULTURE SCREEN ONLY: CPT | Mod: PARLAB

## 2023-11-24 PROCEDURE — 84145 PROCALCITONIN (PCT): CPT | Mod: PARLAB

## 2023-11-24 PROCEDURE — 86738 MYCOPLASMA ANTIBODY: CPT

## 2023-11-24 PROCEDURE — 70450 CT HEAD/BRAIN W/O DYE: CPT

## 2023-11-24 PROCEDURE — 2500000004 HC RX 250 GENERAL PHARMACY W/ HCPCS (ALT 636 FOR OP/ED)

## 2023-11-24 PROCEDURE — 87641 MR-STAPH DNA AMP PROBE: CPT

## 2023-11-24 PROCEDURE — 72125 CT NECK SPINE W/O DYE: CPT

## 2023-11-24 PROCEDURE — 1210000001 HC SEMI-PRIVATE ROOM DAILY

## 2023-11-24 PROCEDURE — 70450 CT HEAD/BRAIN W/O DYE: CPT | Performed by: RADIOLOGY

## 2023-11-24 PROCEDURE — 71045 X-RAY EXAM CHEST 1 VIEW: CPT | Performed by: RADIOLOGY

## 2023-11-24 PROCEDURE — 36415 COLL VENOUS BLD VENIPUNCTURE: CPT | Performed by: PHYSICIAN ASSISTANT

## 2023-11-24 PROCEDURE — 72125 CT NECK SPINE W/O DYE: CPT | Performed by: RADIOLOGY

## 2023-11-24 PROCEDURE — 85025 COMPLETE CBC W/AUTO DIFF WBC: CPT | Performed by: PHYSICIAN ASSISTANT

## 2023-11-24 RX ORDER — LATANOPROST 50 UG/ML
1 SOLUTION/ DROPS OPHTHALMIC NIGHTLY
Status: DISCONTINUED | OUTPATIENT
Start: 2023-11-24 | End: 2023-11-30 | Stop reason: HOSPADM

## 2023-11-24 RX ORDER — CITALOPRAM 20 MG/1
20 TABLET, FILM COATED ORAL DAILY
Status: DISCONTINUED | OUTPATIENT
Start: 2023-11-24 | End: 2023-11-30 | Stop reason: HOSPADM

## 2023-11-24 RX ORDER — OXYBUTYNIN CHLORIDE 5 MG/1
5 TABLET ORAL 3 TIMES DAILY
Status: DISCONTINUED | OUTPATIENT
Start: 2023-11-24 | End: 2023-11-30 | Stop reason: HOSPADM

## 2023-11-24 RX ORDER — ACETAMINOPHEN 500 MG
5 TABLET ORAL NIGHTLY
Status: DISCONTINUED | OUTPATIENT
Start: 2023-11-24 | End: 2023-11-30 | Stop reason: HOSPADM

## 2023-11-24 RX ORDER — DEXTROSE MONOHYDRATE 100 MG/ML
0.3 INJECTION, SOLUTION INTRAVENOUS ONCE AS NEEDED
Status: DISCONTINUED | OUTPATIENT
Start: 2023-11-24 | End: 2023-11-30 | Stop reason: HOSPADM

## 2023-11-24 RX ORDER — SODIUM CHLORIDE 9 MG/ML
75 INJECTION, SOLUTION INTRAVENOUS CONTINUOUS
Status: DISCONTINUED | OUTPATIENT
Start: 2023-11-24 | End: 2023-11-24

## 2023-11-24 RX ORDER — CARBOXYMETHYLCELLULOSE SODIUM 10 MG/ML
1 GEL OPHTHALMIC DAILY
Status: DISCONTINUED | OUTPATIENT
Start: 2023-11-24 | End: 2023-11-24

## 2023-11-24 RX ORDER — IPRATROPIUM BROMIDE AND ALBUTEROL SULFATE 2.5; .5 MG/3ML; MG/3ML
3 SOLUTION RESPIRATORY (INHALATION)
Status: DISCONTINUED | OUTPATIENT
Start: 2023-11-24 | End: 2023-11-24

## 2023-11-24 RX ORDER — POLYETHYLENE GLYCOL 3350 17 G/17G
17 POWDER, FOR SOLUTION ORAL DAILY
Status: DISCONTINUED | OUTPATIENT
Start: 2023-11-24 | End: 2023-11-30 | Stop reason: HOSPADM

## 2023-11-24 RX ORDER — DEXTROSE 50 % IN WATER (D50W) INTRAVENOUS SYRINGE
25
Status: DISCONTINUED | OUTPATIENT
Start: 2023-11-24 | End: 2023-11-30 | Stop reason: HOSPADM

## 2023-11-24 RX ORDER — ACETAMINOPHEN 650 MG/1
650 SUPPOSITORY RECTAL EVERY 4 HOURS PRN
Status: DISCONTINUED | OUTPATIENT
Start: 2023-11-24 | End: 2023-11-30 | Stop reason: HOSPADM

## 2023-11-24 RX ORDER — ACETAMINOPHEN 160 MG/5ML
650 SOLUTION ORAL EVERY 4 HOURS PRN
Status: DISCONTINUED | OUTPATIENT
Start: 2023-11-24 | End: 2023-11-30 | Stop reason: HOSPADM

## 2023-11-24 RX ORDER — VANCOMYCIN HYDROCHLORIDE 1 G/200ML
1000 INJECTION, SOLUTION INTRAVENOUS EVERY 24 HOURS
Status: DISCONTINUED | OUTPATIENT
Start: 2023-11-24 | End: 2023-11-25 | Stop reason: DRUGHIGH

## 2023-11-24 RX ORDER — ACETAMINOPHEN 325 MG/1
650 TABLET ORAL EVERY 4 HOURS PRN
Status: DISCONTINUED | OUTPATIENT
Start: 2023-11-24 | End: 2023-11-30 | Stop reason: HOSPADM

## 2023-11-24 RX ORDER — IPRATROPIUM BROMIDE AND ALBUTEROL SULFATE 2.5; .5 MG/3ML; MG/3ML
3 SOLUTION RESPIRATORY (INHALATION) ONCE
Status: COMPLETED | OUTPATIENT
Start: 2023-11-24 | End: 2023-11-24

## 2023-11-24 RX ORDER — MEROPENEM 1 G/1
1 INJECTION, POWDER, FOR SOLUTION INTRAVENOUS EVERY 12 HOURS
Status: DISCONTINUED | OUTPATIENT
Start: 2023-11-24 | End: 2023-11-27

## 2023-11-24 RX ORDER — BRIMONIDINE TARTRATE AND TIMOLOL MALEATE 2; 5 MG/ML; MG/ML
1 SOLUTION OPHTHALMIC 2 TIMES DAILY
Status: DISCONTINUED | OUTPATIENT
Start: 2023-11-24 | End: 2023-11-24

## 2023-11-24 RX ORDER — LIDOCAINE 560 MG/1
1 PATCH PERCUTANEOUS; TOPICAL; TRANSDERMAL DAILY PRN
Status: DISCONTINUED | OUTPATIENT
Start: 2023-11-24 | End: 2023-11-26

## 2023-11-24 RX ORDER — IPRATROPIUM BROMIDE AND ALBUTEROL SULFATE 2.5; .5 MG/3ML; MG/3ML
3 SOLUTION RESPIRATORY (INHALATION) 3 TIMES DAILY
Status: DISCONTINUED | OUTPATIENT
Start: 2023-11-25 | End: 2023-11-25

## 2023-11-24 RX ORDER — QUETIAPINE FUMARATE 25 MG/1
12.5 TABLET, FILM COATED ORAL NIGHTLY PRN
Status: DISCONTINUED | OUTPATIENT
Start: 2023-11-24 | End: 2023-11-30 | Stop reason: HOSPADM

## 2023-11-24 RX ORDER — IPRATROPIUM BROMIDE AND ALBUTEROL SULFATE 2.5; .5 MG/3ML; MG/3ML
3 SOLUTION RESPIRATORY (INHALATION) EVERY 2 HOUR PRN
Status: DISCONTINUED | OUTPATIENT
Start: 2023-11-24 | End: 2023-11-30 | Stop reason: HOSPADM

## 2023-11-24 RX ORDER — HYDROCHLOROTHIAZIDE 12.5 MG/1
12.5 TABLET ORAL DAILY
Status: DISCONTINUED | OUTPATIENT
Start: 2023-11-24 | End: 2023-11-27

## 2023-11-24 RX ORDER — BRIMONIDINE TARTRATE 2 MG/ML
1 SOLUTION/ DROPS OPHTHALMIC 2 TIMES DAILY
Status: DISCONTINUED | OUTPATIENT
Start: 2023-11-24 | End: 2023-11-30 | Stop reason: HOSPADM

## 2023-11-24 RX ORDER — PANTOPRAZOLE SODIUM 40 MG/1
40 TABLET, DELAYED RELEASE ORAL
Status: DISCONTINUED | OUTPATIENT
Start: 2023-11-25 | End: 2023-11-30 | Stop reason: HOSPADM

## 2023-11-24 RX ORDER — UBIDECARENONE 75 MG
500 CAPSULE ORAL DAILY
Status: DISCONTINUED | OUTPATIENT
Start: 2023-11-24 | End: 2023-11-30 | Stop reason: HOSPADM

## 2023-11-24 RX ADMIN — BRIMONIDINE TARTRATE 1 DROP: 2 SOLUTION/ DROPS OPHTHALMIC at 21:54

## 2023-11-24 RX ADMIN — MELATONIN TAB 5 MG 5 MG: 5 TAB at 21:53

## 2023-11-24 RX ADMIN — VANCOMYCIN HYDROCHLORIDE 1000 MG: 1 INJECTION, SOLUTION INTRAVENOUS at 21:54

## 2023-11-24 RX ADMIN — HYPROMELLOSE 2910 1 DROP: 5 SOLUTION OPHTHALMIC at 21:54

## 2023-11-24 RX ADMIN — DOXYCYCLINE 100 MG: 100 INJECTION, POWDER, LYOPHILIZED, FOR SOLUTION INTRAVENOUS at 17:29

## 2023-11-24 RX ADMIN — OXYBUTYNIN CHLORIDE 5 MG: 5 TABLET ORAL at 21:53

## 2023-11-24 RX ADMIN — LATANOPROST 1 DROP: 50 SOLUTION OPHTHALMIC at 21:54

## 2023-11-24 RX ADMIN — MEROPENEM 1 G: 1 INJECTION, POWDER, FOR SOLUTION INTRAVENOUS at 21:54

## 2023-11-24 RX ADMIN — IPRATROPIUM BROMIDE AND ALBUTEROL SULFATE 3 ML: .5; 3 SOLUTION RESPIRATORY (INHALATION) at 16:08

## 2023-11-24 ASSESSMENT — COLUMBIA-SUICIDE SEVERITY RATING SCALE - C-SSRS
6. HAVE YOU EVER DONE ANYTHING, STARTED TO DO ANYTHING, OR PREPARED TO DO ANYTHING TO END YOUR LIFE?: NO
2. HAVE YOU ACTUALLY HAD ANY THOUGHTS OF KILLING YOURSELF?: NO
1. IN THE PAST MONTH, HAVE YOU WISHED YOU WERE DEAD OR WISHED YOU COULD GO TO SLEEP AND NOT WAKE UP?: NO

## 2023-11-24 ASSESSMENT — COGNITIVE AND FUNCTIONAL STATUS - GENERAL
STANDING UP FROM CHAIR USING ARMS: A LOT
MOBILITY SCORE: 10
WALKING IN HOSPITAL ROOM: A LOT
DRESSING REGULAR LOWER BODY CLOTHING: A LOT
MOVING TO AND FROM BED TO CHAIR: A LOT
HELP NEEDED FOR BATHING: A LOT
PERSONAL GROOMING: A LOT
DRESSING REGULAR UPPER BODY CLOTHING: A LOT
CLIMB 3 TO 5 STEPS WITH RAILING: TOTAL
EATING MEALS: A LOT
HELP NEEDED FOR BATHING: A LOT
TOILETING: A LOT
DAILY ACTIVITIY SCORE: 12
DAILY ACTIVITIY SCORE: 12
CLIMB 3 TO 5 STEPS WITH RAILING: TOTAL
WALKING IN HOSPITAL ROOM: A LOT
PATIENT BASELINE BEDBOUND: NO
EATING MEALS: A LOT
MOVING FROM LYING ON BACK TO SITTING ON SIDE OF FLAT BED WITH BEDRAILS: A LOT
DRESSING REGULAR UPPER BODY CLOTHING: A LOT
TURNING FROM BACK TO SIDE WHILE IN FLAT BAD: TOTAL
MOVING TO AND FROM BED TO CHAIR: A LOT
TOILETING: A LOT
MOBILITY SCORE: 10
STANDING UP FROM CHAIR USING ARMS: A LOT
TURNING FROM BACK TO SIDE WHILE IN FLAT BAD: TOTAL
PERSONAL GROOMING: A LOT
DRESSING REGULAR LOWER BODY CLOTHING: A LOT
MOVING FROM LYING ON BACK TO SITTING ON SIDE OF FLAT BED WITH BEDRAILS: A LOT

## 2023-11-24 ASSESSMENT — PAIN - FUNCTIONAL ASSESSMENT
PAIN_FUNCTIONAL_ASSESSMENT: 0-10
PAIN_FUNCTIONAL_ASSESSMENT: 0-10

## 2023-11-24 ASSESSMENT — ACTIVITIES OF DAILY LIVING (ADL)
TOILETING: NEEDS ASSISTANCE
JUDGMENT_ADEQUATE_SAFELY_COMPLETE_DAILY_ACTIVITIES: NO
GROOMING: NEEDS ASSISTANCE
ADEQUATE_TO_COMPLETE_ADL: NO
FEEDING YOURSELF: NEEDS ASSISTANCE
ASSISTIVE_DEVICE: OXYGEN;WALKER
HEARING - LEFT EAR: FUNCTIONAL
HEARING - RIGHT EAR: FUNCTIONAL
BATHING: NEEDS ASSISTANCE
DRESSING YOURSELF: NEEDS ASSISTANCE
WALKS IN HOME: NEEDS ASSISTANCE
PATIENT'S MEMORY ADEQUATE TO SAFELY COMPLETE DAILY ACTIVITIES?: NO

## 2023-11-24 ASSESSMENT — PAIN DESCRIPTION - DESCRIPTORS: DESCRIPTORS: ACHING

## 2023-11-24 ASSESSMENT — PAIN DESCRIPTION - LOCATION: LOCATION: RIB CAGE

## 2023-11-24 ASSESSMENT — LIFESTYLE VARIABLES
HAVE YOU EVER FELT YOU SHOULD CUT DOWN ON YOUR DRINKING: NO
EVER HAD A DRINK FIRST THING IN THE MORNING TO STEADY YOUR NERVES TO GET RID OF A HANGOVER: NO
REASON UNABLE TO ASSESS: NO
EVER FELT BAD OR GUILTY ABOUT YOUR DRINKING: NO
HAVE PEOPLE ANNOYED YOU BY CRITICIZING YOUR DRINKING: NO

## 2023-11-24 ASSESSMENT — PAIN DESCRIPTION - ORIENTATION: ORIENTATION: LEFT;POSTERIOR

## 2023-11-24 ASSESSMENT — PAIN SCALES - GENERAL: PAINLEVEL_OUTOF10: 0 - NO PAIN

## 2023-11-24 NOTE — PROGRESS NOTES
Vancomycin Dosing by Pharmacy- INITIAL    Ayse Cardoza is a 89 y.o. year old female who Pharmacy has been consulted for vancomycin dosing for pneumonia. Based on the patient's indication and renal status this patient will be dosed based on a goal AUC of 400-600.     Renal function is currently stable.    Visit Vitals  /67   Pulse 58   Temp 36.2 °C (97.2 °F)   Resp 18        Lab Results   Component Value Date    CREATININE 1.03 11/24/2023    CREATININE 1.09 (H) 11/21/2023    CREATININE 0.99 11/20/2023    CREATININE 1.38 (H) 04/15/2022    CREATININE 1.38 (H) 04/11/2022    CREATININE 1.05 04/07/2022    CREATININE 1.38 (H) 12/25/2020        Patient weight is 81 kg      Assessment/Plan     Patient will not be given a loading dose.  Will initiate vancomycin maintenance,  1000 mg every 24 hours.    This dosing regimen is predicted by [facility] to result in the following pharmacokinetic parameters:  Regimen: 1000 mg IV every 24 hours.  Start time: 20:00 on 11/24/2023  Exposure target: AUC24 (range)400-600 mg/L.hr   AUC24,ss: 439 mg/L.hr  Probability of AUC24 > 400: 61 %  Ctrough,ss: 13.8 mg/L  Probability of Ctrough,ss > 20: 16 %  Probability of nephrotoxicity (Lodise CB 2009): 9 %      Follow-up level will be ordered on 11/25/23 at 0430 unless clinically indicated sooner.  Will continue to monitor renal function daily while on vancomycin and order serum creatinine at least every 48 hours if not already ordered.  Follow for continued vancomycin needs, clinical response, and signs/symptoms of toxicity.       Denisse Luo, PharmD

## 2023-11-24 NOTE — H&P
[facility] Medicine History & Physical    Patient Name: Ayse Cardoza   YOB: 1934    Subjective:  Ayse Cardoza is a 89 y.o. female with past medical history of HTN, HLD, CAD, asthma, anemia, venous insufficiency, h/o DVT (not on anticoagulation), arteritis, polymyalgia rheumatica, glaucoma, mild cognitive impairment, GERD who presented to [facility] ED on 11/14/23 following a fall. Of note, the patient was recently admitted to the hospital for similar presentation and discharged on 11/22/23. Patient was found to have right subdural hematoma (2-3mm). No surgical interventional was performed and hematoma was seen as stable on repeat imaging.     Patient was discharged on Wednesday 11/22. History supplemented by Son/POA due to patient's altered mentation. Per Son, since being discharged the patient mental status has been progressively worsening. He mentions that she has also been having visual hallucinations and being more lethargic. He mentions that the patient has behaving strangely as well and mentions that she did not recognize her  of 60 years. They were prompted to come to the ED after the patient had fallen a second time. She had attempted to get up from bed at night when she fell. The patient mentions that she felt like she tripped on something. Her  was in the room; although, did not see her fall due to it being dark. The patient denies hitting her head, syncope, or palpitations. She mentions that she has been coughing more over the last week with no sputum production.     In the ED, patient was noted to be wheezy on exam and placed on 2L NC. Vitals were significant for SpO2 100% on 2L, /64. Labs significant for for BUN 31, WBC 12.7. CXR appears to show a right sided pneumonia. Patient was given albuterol and IV doxycycline in the ED. Patient was also taken for CT Head and C-spine with result not currently read.    A 10 point ROS was completed and is negative expect as  "stated in HPI.     Past Medical History: As stated above    Past Surgical History: As stated above.    Social History: Patient currently lives in assisted living with .    Family History: Noncontributory    Objective:    /64   Pulse 63   Temp 36.2 °C (97.2 °F)   Resp 19   Ht 1.651 m (5' 5\")   Wt 81.6 kg (180 lb)   SpO2 100%   BMI 29.95 kg/m²     Physical Exam:  GENERAL: A&Ox2, no distress,  HEENT: ecchymoses noted around face, no active bleeding  CARDIOVASCULAR: Regular rate and rhythm, no murmurs, rubs, or gallops. S1/S2  RESPIRATORY: diminished breath sounds bilaterally  ABDOMEN: Soft, ND, non-tender. No rebound or guarding. BS+  EXTREMITIES: bilateral 1+ pitting edema, ecchymosis noted bilaterally  NEUROLOGICAL: A&O x2. CN II-XII grossly intact. No focal deficits    Assessment/Plan:    Acute Hypoxic Respiratory Failure  Health Care Associated Pneumonia  Metabolic Encephalopathy in setting of infection  Concerns for worsening of known subdural hematoma  Recurrent Falls, Ambulatory Dysfunction, Debility    Given patient's recent hospital admission, will treat for HCAP. Escalate antibiotics to Vanc and Merrem. Will consult Pulm as well. Wean O2 as tolerated. Duonebs PRN and scheduled.  Continue home Citalopram. Will place PRN nightly Seroquel.  Repeat imaging showed no progression of subdural hematoma  PT/OT, patient maybe best suited for SNF given previous low AMPAC from last admission. Fall Precautions.    DVT Prophylaxis: Held in setting of hematoma  Code Status: DNR Comfort Measures Only, Son - POA  Disposition: Telemetry      Erik Oakley MD  [facility]   "

## 2023-11-24 NOTE — ED PROVIDER NOTES
Limitations to History: none  External Records Reviewed  Independent Historians: none  Social determinants affecting care: none    HPI  Ayse Cardoza is a 89 y.o. female with history of polymyalgia rheumatica, glaucoma, arthritis, hypertension, GERD, chronic kidney disease, asthma, venous insufficiency, anemia, depression, mild cognitive impairment, DVT, who presents emergency department for assessment of a fall.  She currently lives at assisted living is had multiple falls over the last week.  She is currently denying any pain however she reports that she struck her head.  She did not lose consciousness.  She is not on anticoagulants.  She has no further complaints.    [facility]  Past Medical History:   Diagnosis Date    Anemia     Asthma     Chronic hyponatremia     CKD (chronic kidney disease)     Depression     HTN (hypertension)     MCI (mild cognitive impairment)     Polymyalgia rheumatica (CMS/HCC)     SDH (subdural hematoma) (CMS/[facility]) 11/20/2023    Stroke (cerebrum) (CMS/[facility])     White coat syndrome with hypertension     reviewed by myself.    Meds  Current Outpatient Medications   Medication Instructions    acetaminophen (TYLENOL) 650 mg, oral, Every 6 hours PRN    alendronate (FOSAMAX) 70 mg, oral, Every 7 days    brimonidine-timoloL (Combigan) 0.2-0.5 % ophthalmic solution 1 drop, Both Eyes, 2 times daily    calcium carbonate-vitamin D3 500 mg-5 mcg (200 unit) tablet 1 tablet, oral, Daily    carboxymethylcellulose (Refresh Plus) 0.5 % ophthalmic solution 1 drop, Both Eyes, 2 times daily    cetirizine (ZYRTEC) 10 mg, oral, Daily PRN    citalopram (CELEXA) 20 mg, oral, Daily    cyanocobalamin (VITAMIN B-12) 500 mcg, oral, Daily    cycloSPORINE (Restasis) 0.05 % ophthalmic emulsion 1 drop, Both Eyes, 4 times daily    diclofenac sodium (VOLTAREN) 4 g, Topical, 3 times daily PRN, To right lower leg    fluticasone (Flonase) 50 mcg/actuation nasal spray 1 spray, Each Nostril, Daily PRN, Shake gently. Before first  "use, prime pump. After use, clean tip and replace cap.    hydroCHLOROthiazide (HYDRODIURIL) 12.5 mg, oral, Daily    ipratropium-albuteroL (Duo-Neb) 0.5-2.5 mg/3 mL nebulizer solution 3 mL, nebulization, Every 4 hours PRN, Please make sure the RN at [facility] listens to lungs and this will likely be needed at least daily for first week after discharge    latanoprost (Xalatan) 0.005 % ophthalmic solution 1 drop, Both Eyes, Nightly    lidocaine 4 % patch 1 patch, transdermal, Daily, Remove & discard patch within 12 hours or as directed by MD.    meloxicam (MOBIC) 15 mg, oral, Daily, Wait at least one more week before resuming this medication as it can increase the risk of bleeding    mirabegron (MYRBETRIQ) 50 mg, oral, Daily    moisturizing mouth (Biotene Dry Mouth Oral Rinse) solution 15 mL, Swish & Spit, 2 times daily    omeprazole (PRILOSEC) 20 mg, oral, Daily    sodium chloride 1 g, oral, Daily       Allergies  Allergies   Allergen Reactions    Fenofibrate Myalgia    Statins-Hmg-Coa Reductase Inhibitors Myalgia    Cefazolin Rash    Gabapentin Other     felt goofy on it    Lisinopril Cough    Penicillins Rash    reviewed by myself.    SHx  Social History     Tobacco Use    Smoking status: Never    Smokeless tobacco: Never   Substance Use Topics    Alcohol use: Yes     Comment: rare    Drug use: Not Currently    reviewed by myself.      ------------------------------------------------------------------------------------------------------------------------------------------    /82   Pulse 94   Temp 36.3 °C (97.3 °F)   Resp 20   Ht 1.651 m (5' 5\")   Wt 81.6 kg (180 lb)   SpO2 93%   BMI 29.95 kg/m²     Physical Exam  Vitals and nursing note reviewed.   Constitutional:       Appearance: Normal appearance. She is normal weight.   HENT:      Head: Normocephalic.      Nose: Nose normal.      Comments: Scabbed over wound to the upper lip.     Mouth/Throat:      Mouth: Mucous membranes are moist.      Pharynx: " Oropharynx is clear.   Eyes:      Extraocular Movements: Extraocular movements intact.      Conjunctiva/sclera: Conjunctivae normal.   Cardiovascular:      Rate and Rhythm: Normal rate and regular rhythm.   Pulmonary:      Effort: Pulmonary effort is normal.      Breath sounds: No decreased air movement. Wheezing present.   Abdominal:      General: Abdomen is flat. Bowel sounds are normal. There is no distension.      Palpations: Abdomen is soft.      Tenderness: There is no abdominal tenderness.   Musculoskeletal:         General: Normal range of motion.      Cervical back: Normal range of motion and neck supple.      Right lower le+ Edema present.      Left lower le+ Edema present.   Skin:     General: Skin is warm and dry.      Findings: Ecchymosis (left cheek and neck from previous fall. Right forearm) present.   Neurological:      General: No focal deficit present.      Mental Status: She is alert.      Cranial Nerves: Cranial nerves 2-12 are intact.      Sensory: Sensation is intact.      Motor: Motor function is intact.   Psychiatric:         Attention and Perception: Attention normal.         Mood and Affect: Mood normal.          ------------------------------------------------------------------------------------------------------------------------------------------  Imaging  XR chest 1 view   Final Result   1.  Patchy airspace opacities in the right upper and bilateral lower   lungs, due to multifocal pneumonia or aspiration. Small bilateral   pleural effusions.   2. Borderline cardiomegaly with mild pulmonary vascular congestion.   3. No pneumothorax.   4. Diffuse osteopenia. Multilevel thoracic spondylosis             MACRO:   None        Signed by: Fermin Hodge 2023 5:35 PM   Dictation workstation:   [id number]      CT head wo IV contrast   Final Result   Stable trace acute subdural hematoma overlying the right   frontoparietal lobes and along the right cerebellar tentorium        No  acute fracture or traumatic subluxation in the cervical spine.        Signed by: Fermin Hodge 11/24/2023 5:34 PM   Dictation workstation:   [id number]      CT cervical spine wo IV contrast   Final Result   Stable trace acute subdural hematoma overlying the right   frontoparietal lobes and along the right cerebellar tentorium        No acute fracture or traumatic subluxation in the cervical spine.        Signed by: Fermin Hodge 11/24/2023 5:34 PM   Dictation workstation:   [id number]           Labs  Labs Reviewed   MRSA SURVEILLANCE FOR VANCOMYCIN DE-ESCALATION, PCR - Abnormal       Result Value    MRSA PCR Detected (*)     Narrative:     This assay is an FDA-approved in vitro diagnostic nucleic acid amplification test for the detection of methicillin-resistant Staphylococcus aureus (MRSA) DNA directly from nasal swabs in patients at risk for nasal colonization. MRSA NxG is intended to aid in the prevention and control of MRSA infections in healthcare settings. This assay is NOT intended to diagnose, guide, or monitor treatment for MRSA infections, or provide results of susceptibility to methicillin. A negative result does not preclude MRSA nasal colonization. Test performance has not been evaluated in patients less than two years of age.   STAPHYLOCOCCUS AUREUS/MRSA COLONIZATION, CULTURE - Abnormal    Staph/MRSA Screen Culture   (*)     Value: Isolated: Methicillin Susceptible Staphylococcus aureus (MSSA)   CBC WITH AUTO DIFFERENTIAL - Abnormal    WBC 12.7 (*)     nRBC 0.0      RBC 3.72 (*)     Hemoglobin 11.3 (*)     Hematocrit 35.7 (*)     MCV 96      MCH 30.4      MCHC 31.7 (*)     RDW 15.4 (*)     Platelets 175      Neutrophils % 77.0      Immature Granulocytes %, Automated 0.5      Lymphocytes % 9.4      Monocytes % 10.5      Eosinophils % 2.2      Basophils % 0.4      Neutrophils Absolute 9.79 (*)     Immature Granulocytes Absolute, Automated 0.06      Lymphocytes Absolute 1.19      Monocytes Absolute 1.33  (*)     Eosinophils Absolute 0.28      Basophils Absolute 0.05     COMPREHENSIVE METABOLIC PANEL - Abnormal    Glucose 91      Sodium 133 (*)     Potassium 4.0      Chloride 100      Bicarbonate 25      Anion Gap 12      Urea Nitrogen 31 (*)     Creatinine 1.03      eGFR 52 (*)     Calcium 9.2      Albumin 3.4      Alkaline Phosphatase 107      Total Protein 6.6      AST 31      Bilirubin, Total 1.1      ALT 17     PROCALCITONIN TEST - Abnormal    Procalcitonin 0.11 (*)     Narrative:     Procalcitonin (PCT) results measured serially can  aid in decision-making for antibiotic discontinuation in  patients with suspected or confirmed sepsis in conjunction  with additional clinical information. Antibiotic  discontinuation may be considered with a change in PCT of  >80% from the peak result or when PCT falls below 0.50 ng/mL.    Procalcitonin results should not be used in isolation but  should be interpreted in conjunction with additional clinical  and laboratory findings. Procalcitonin results should not be  used to guide the initiation of antibiotic therapy.    Falsely low PCT values in the presence of bacterial infection  may occur in early infection, with atypical pathogens,  localized infections, and subacute infectious endocarditis.    Falsely elevated results outside of severe bacterial  infection/sepsis may be seen in patients with renal failure  or insufficiency, severe trauma or burns, recent major  abdominal/cardiac surgery, acute multi-organ failure, rarely  in patients with medullary thyroid carcinoma and rare  neuroendocrine tumors, and non-specific interfering antibodies  (heterophile antibodies, rheumatoid factor, human anti-mouse  antibodies (HAMA), etc).    Performance of the PCT test in pediatric patients (<19yo),  pregnant women, immunocompromised patients, and patients on  immunomodulatory medications has not been evaluated.   CBC - Abnormal    WBC 10.2      nRBC 0.0      RBC 3.38 (*)     Hemoglobin  10.3 (*)     Hematocrit 32.3 (*)     MCV 96      MCH 30.5      MCHC 31.9 (*)     RDW 15.6 (*)     Platelets 157     BASIC METABOLIC PANEL - Abnormal    Glucose 80      Sodium 134 (*)     Potassium 3.8      Chloride 103      Bicarbonate 24      Anion Gap 11      Urea Nitrogen 29 (*)     Creatinine 0.91      eGFR 60 (*)     Calcium 8.4 (*)    MAGNESIUM - Abnormal    Magnesium 1.52 (*)    URINALYSIS WITH REFLEX MICROSCOPIC AND CULTURE - Abnormal    Color, Urine Yellow      Appearance, Urine Clear      Specific Gravity, Urine 1.016      pH, Urine 5.0      Protein, Urine NEGATIVE      Glucose, Urine NEGATIVE      Blood, Urine NEGATIVE      Ketones, Urine NEGATIVE      Bilirubin, Urine NEGATIVE      Urobilinogen, Urine <2.0      Nitrite, Urine POSITIVE (*)     Leukocyte Esterase, Urine NEGATIVE     MICROSCOPIC ONLY, URINE - Abnormal    WBC, Urine 1-5      RBC, Urine 1-2      Squamous Epithelial Cells, Urine 1-9 (SPARSE)      Bacteria, Urine 3+ (*)    CBC - Abnormal    WBC 8.3      nRBC 0.0      RBC 3.32 (*)     Hemoglobin 10.2 (*)     Hematocrit 31.4 (*)     MCV 95      MCH 30.7      MCHC 32.5      RDW 15.6 (*)     Platelets 158     BASIC METABOLIC PANEL - Abnormal    Glucose 80      Sodium 133 (*)     Potassium 3.5      Chloride 102      Bicarbonate 26      Anion Gap 9 (*)     Urea Nitrogen 27 (*)     Creatinine 0.83      eGFR 67      Calcium 8.2 (*)    VANCOMYCIN - Abnormal    Vancomycin 22.6 (*)     Narrative:     Vancomycin levels can be monitored according to area under the curve (AUC) or concentration (ug/mL). The preferred monitoring strategy is determined by the patient's renal function and indication for therapy.     For AUC monitoring, a random vancomycin level should be interpreted in the context of AUC rather than the concentration at a single point in time.    For concentration monitoring, a trough concentration drawn immediately prior to the next dose is preferred.       Therapeutic ranges using  concentration-guided results:  Peak (all ages):                  30.0-40.0 ug/mL  Trough (all ages):                10.0-20.0 ug/mL              CBC - Abnormal    WBC 8.7      nRBC 0.0      RBC 3.56 (*)     Hemoglobin 11.1 (*)     Hematocrit 32.9 (*)     MCV 92      MCH 31.2      MCHC 33.7      RDW 15.7 (*)     Platelets 186     BASIC METABOLIC PANEL - Abnormal    Glucose 93      Sodium 134 (*)     Potassium 3.9      Chloride 102      Bicarbonate 25      Anion Gap 11      Urea Nitrogen 22      Creatinine 0.74      eGFR 77      Calcium 8.3 (*)    LEGIONELLA ANTIGEN, URINE - Normal    L. pneumophila Urine Ag Negative     STREPTOCOCCUS PNEUMONIAE ANTIGEN, URINE - Normal    Streptococcus pneumoniae Ag, Urine Negative     URINE CULTURE - Normal    Urine Culture No significant growth     PHOSPHORUS - Normal    Phosphorus 3.0     VANCOMYCIN - Normal    Vancomycin 10.9      Narrative:     Vancomycin levels can be monitored according to area under the curve (AUC) or concentration (ug/mL). The preferred monitoring strategy is determined by the patient's renal function and indication for therapy.     For AUC monitoring, a random vancomycin level should be interpreted in the context of AUC rather than the concentration at a single point in time.    For concentration monitoring, a trough concentration drawn immediately prior to the next dose is preferred.       Therapeutic ranges using concentration-guided results:  Peak (all ages):                  30.0-40.0 ug/mL  Trough (all ages):                10.0-20.0 ug/mL              MAGNESIUM - Normal    Magnesium 1.90     MAGNESIUM - Normal    Magnesium 1.70     URINALYSIS WITH REFLEX MICROSCOPIC AND CULTURE    Narrative:     The following orders were created for panel order Urinalysis with Reflex Microscopic and Culture.  Procedure                               Abnormality         Status                     ---------                               -----------         ------                      Urinalysis with Reflex M...[[id number]]                                                 Extra Urine Gray Tube[[id number]]                            Final result                 Please view results for these tests on the individual orders.   EXTRA URINE GRAY TUBE    Extra Tube Hold for add-ons.     URINALYSIS WITH REFLEX MICROSCOPIC AND CULTURE    Narrative:     The following orders were created for panel order Urinalysis with Reflex Microscopic and Culture.  Procedure                               Abnormality         Status                     ---------                               -----------         ------                     Urinalysis with Reflex M...[[id number]]  Abnormal            Final result               Extra Urine Gray Tube[[id number]]                            Final result                 Please view results for these tests on the individual orders.   EXTRA URINE GRAY TUBE    Extra Tube Hold for add-ons.     MYCOPLASMA PNEUMONIAE ANTIBODY, IGM        ED Course  ED Course as of 11/27/23 0959 Fri Nov 24, 2023   1644 XR chest 1 view  Right lower lobe pneumonia, per my view. [AB]      ED Course User Index  [AB] Vern Farias MD         Diagnoses as of 11/27/23 0959   Multiple falls   Hallucination   History of rib fracture   History of intracranial hemorrhage   Pneumonia of right lung due to infectious organism, unspecified part of lung        Medical Decision Making: She did not appear ill or toxic.  Vital signs reviewed.  Having a hard time getting a pulse ox however very wheezy and was placed on 2 L of oxygen via nasal cannula and ordered DuoNeb treatments.  She is a DNR comfort care which was confirmed by her son who is the POA.  Regardless of the workup today, he believes that she needs to be transition from assisted living to a skilled nursing facility due to the multiple falls.  He reported that over the last few days she has been having increased hallucinations since being discharged  from the hospital.  She has gotten this way before in the past when she has had a UTI.  He is also concerned that maybe her intracranial hemorrhage is worse than it was previously.  I did order imaging along with labs and a urinalysis.    Differential diagnoses considered: Pneumonia, acute respiratory failure, electrolyte abnormalities, worsening intracranial hemorrhage, UTI, delirium, others    Medications given: Nebulized DuoNeb treatment    I reviewed the labs from today. She has a leukocytosis of 14.6.  Hemoglobin 11.9 with hematocrit of 34 reviewed the labs from today.  Leukocytosis at 12.7.  Hemoglobin 11.3 with hematocrit of 35.7.  Normal platelets.  Sodium 133.  BUN 31 with a normal creatinine.  Chest x-ray appears to show a pneumonia on the right side.  She does have multiple allergies to antibiotics that cause rash.  I will give her IV doxycycline.  Patient's family updated and agreeable to plan of care.  I consulted the hospitalist service for patient admission.  I spoke with Dr. Ely who will admit.    Case discussed and evaluated with ED attending, Dr. Farias agreeable to patient plan of care.      Diagnosis: Pneumonia, hypoxia, hallucinations  Plan: Admit           Cam Morales PA-C  11/27/23 0959

## 2023-11-24 NOTE — PROGRESS NOTES
Pharmacy Medication History Review    Ayse Cardoza is a 89 y.o. female admitted for No Principal Problem: There is no principal problem currently on the Problem List. Please update the Problem List and refresh.. Pharmacy reviewed the patient's [id number] medications and allergies for accuracy.    The list below reflectives the updated PTA list. Please review each medication in order reconciliation for additional clarification and justification.  (Not in a hospital admission)       The list below reflectives the updated allergy list. Please review each documented allergy for additional clarification and justification.  Allergies  Reviewed by Vanessa Posada CPhT on 11/24/2023        Severity Reactions Comments    Fenofibrate Medium Myalgia     Statins-hmg-coa Reductase Inhibitors Medium Myalgia     Cefazolin Low Rash     Gabapentin Low Other felt goofy on it    Lisinopril Low Cough     Penicillins Low Rash             Below are additional concerns with the patient's PTA list.    See PTA med list    Vanessa Posada CPhT

## 2023-11-25 LAB
ANION GAP SERPL CALC-SCNC: 11 MMOL/L (ref 10–20)
APPEARANCE UR: CLEAR
BACTERIA #/AREA URNS AUTO: ABNORMAL /HPF
BILIRUB UR STRIP.AUTO-MCNC: NEGATIVE MG/DL
BUN SERPL-MCNC: 29 MG/DL (ref 6–23)
CALCIUM SERPL-MCNC: 8.4 MG/DL (ref 8.6–10.3)
CHLORIDE SERPL-SCNC: 103 MMOL/L (ref 98–107)
CO2 SERPL-SCNC: 24 MMOL/L (ref 21–32)
COLOR UR: YELLOW
CREAT SERPL-MCNC: 0.91 MG/DL (ref 0.5–1.05)
ERYTHROCYTE [DISTWIDTH] IN BLOOD BY AUTOMATED COUNT: 15.6 % (ref 11.5–14.5)
GFR SERPL CREATININE-BSD FRML MDRD: 60 ML/MIN/1.73M*2
GLUCOSE SERPL-MCNC: 80 MG/DL (ref 74–99)
GLUCOSE UR STRIP.AUTO-MCNC: NEGATIVE MG/DL
HCT VFR BLD AUTO: 32.3 % (ref 36–46)
HGB BLD-MCNC: 10.3 G/DL (ref 12–16)
HOLD SPECIMEN: NORMAL
KETONES UR STRIP.AUTO-MCNC: NEGATIVE MG/DL
LEGIONELLA AG UR QL: NEGATIVE
LEUKOCYTE ESTERASE UR QL STRIP.AUTO: NEGATIVE
MAGNESIUM SERPL-MCNC: 1.52 MG/DL (ref 1.6–2.4)
MCH RBC QN AUTO: 30.5 PG (ref 26–34)
MCHC RBC AUTO-ENTMCNC: 31.9 G/DL (ref 32–36)
MCV RBC AUTO: 96 FL (ref 80–100)
MRSA DNA SPEC QL NAA+PROBE: DETECTED
NITRITE UR QL STRIP.AUTO: POSITIVE
NRBC BLD-RTO: 0 /100 WBCS (ref 0–0)
PH UR STRIP.AUTO: 5 [PH]
PLATELET # BLD AUTO: 157 X10*3/UL (ref 150–450)
POTASSIUM SERPL-SCNC: 3.8 MMOL/L (ref 3.5–5.3)
PROCALCITONIN SERPL-MCNC: 0.11 NG/ML
PROT UR STRIP.AUTO-MCNC: NEGATIVE MG/DL
RBC # BLD AUTO: 3.38 X10*6/UL (ref 4–5.2)
RBC # UR STRIP.AUTO: NEGATIVE /UL
RBC #/AREA URNS AUTO: ABNORMAL /HPF
S PNEUM AG UR QL: NEGATIVE
SODIUM SERPL-SCNC: 134 MMOL/L (ref 136–145)
SP GR UR STRIP.AUTO: 1.02
SQUAMOUS #/AREA URNS AUTO: ABNORMAL /HPF
UROBILINOGEN UR STRIP.AUTO-MCNC: <2 MG/DL
VANCOMYCIN SERPL-MCNC: 10.9 UG/ML (ref 5–20)
WBC # BLD AUTO: 10.2 X10*3/UL (ref 4.4–11.3)
WBC #/AREA URNS AUTO: ABNORMAL /HPF

## 2023-11-25 PROCEDURE — 2500000004 HC RX 250 GENERAL PHARMACY W/ HCPCS (ALT 636 FOR OP/ED): Performed by: STUDENT IN AN ORGANIZED HEALTH CARE EDUCATION/TRAINING PROGRAM

## 2023-11-25 PROCEDURE — 87899 AGENT NOS ASSAY W/OPTIC: CPT | Mod: PARLAB

## 2023-11-25 PROCEDURE — 80048 BASIC METABOLIC PNL TOTAL CA: CPT

## 2023-11-25 PROCEDURE — 80202 ASSAY OF VANCOMYCIN: CPT

## 2023-11-25 PROCEDURE — 99233 SBSQ HOSP IP/OBS HIGH 50: CPT | Performed by: INTERNAL MEDICINE

## 2023-11-25 PROCEDURE — 2500000001 HC RX 250 WO HCPCS SELF ADMINISTERED DRUGS (ALT 637 FOR MEDICARE OP)

## 2023-11-25 PROCEDURE — 87449 NOS EACH ORGANISM AG IA: CPT | Mod: PARLAB

## 2023-11-25 PROCEDURE — 94640 AIRWAY INHALATION TREATMENT: CPT

## 2023-11-25 PROCEDURE — 2500000002 HC RX 250 W HCPCS SELF ADMINISTERED DRUGS (ALT 637 FOR MEDICARE OP, ALT 636 FOR OP/ED)

## 2023-11-25 PROCEDURE — 1210000001 HC SEMI-PRIVATE ROOM DAILY

## 2023-11-25 PROCEDURE — 87086 URINE CULTURE/COLONY COUNT: CPT | Mod: PARLAB

## 2023-11-25 PROCEDURE — 36415 COLL VENOUS BLD VENIPUNCTURE: CPT

## 2023-11-25 PROCEDURE — 85027 COMPLETE CBC AUTOMATED: CPT

## 2023-11-25 PROCEDURE — 81001 URINALYSIS AUTO W/SCOPE: CPT

## 2023-11-25 PROCEDURE — 2500000005 HC RX 250 GENERAL PHARMACY W/O HCPCS

## 2023-11-25 PROCEDURE — 2500000004 HC RX 250 GENERAL PHARMACY W/ HCPCS (ALT 636 FOR OP/ED)

## 2023-11-25 PROCEDURE — 97161 PT EVAL LOW COMPLEX 20 MIN: CPT | Mod: GP

## 2023-11-25 PROCEDURE — 97166 OT EVAL MOD COMPLEX 45 MIN: CPT | Mod: GO

## 2023-11-25 PROCEDURE — 83735 ASSAY OF MAGNESIUM: CPT

## 2023-11-25 PROCEDURE — 2500000002 HC RX 250 W HCPCS SELF ADMINISTERED DRUGS (ALT 637 FOR MEDICARE OP, ALT 636 FOR OP/ED): Performed by: INTERNAL MEDICINE

## 2023-11-25 PROCEDURE — 2500000004 HC RX 250 GENERAL PHARMACY W/ HCPCS (ALT 636 FOR OP/ED): Performed by: INTERNAL MEDICINE

## 2023-11-25 RX ORDER — MAGNESIUM SULFATE HEPTAHYDRATE 40 MG/ML
2 INJECTION, SOLUTION INTRAVENOUS ONCE
Status: COMPLETED | OUTPATIENT
Start: 2023-11-25 | End: 2023-11-25

## 2023-11-25 RX ORDER — IPRATROPIUM BROMIDE AND ALBUTEROL SULFATE 2.5; .5 MG/3ML; MG/3ML
3 SOLUTION RESPIRATORY (INHALATION) 3 TIMES DAILY
Status: DISCONTINUED | OUTPATIENT
Start: 2023-11-26 | End: 2023-11-30 | Stop reason: HOSPADM

## 2023-11-25 RX ORDER — SODIUM CHLORIDE 9 MG/ML
50 INJECTION, SOLUTION INTRAVENOUS CONTINUOUS
Status: DISCONTINUED | OUTPATIENT
Start: 2023-11-25 | End: 2023-11-26

## 2023-11-25 RX ADMIN — IPRATROPIUM BROMIDE AND ALBUTEROL SULFATE 3 ML: .5; 3 SOLUTION RESPIRATORY (INHALATION) at 14:43

## 2023-11-25 RX ADMIN — LATANOPROST 1 DROP: 50 SOLUTION OPHTHALMIC at 20:56

## 2023-11-25 RX ADMIN — MAGNESIUM SULFATE HEPTAHYDRATE 2 G: 40 INJECTION, SOLUTION INTRAVENOUS at 15:06

## 2023-11-25 RX ADMIN — SODIUM CHLORIDE 75 ML/HR: 9 INJECTION, SOLUTION INTRAVENOUS at 01:09

## 2023-11-25 RX ADMIN — BRIMONIDINE TARTRATE 1 DROP: 2 SOLUTION/ DROPS OPHTHALMIC at 09:35

## 2023-11-25 RX ADMIN — OXYBUTYNIN CHLORIDE 5 MG: 5 TABLET ORAL at 20:51

## 2023-11-25 RX ADMIN — IPRATROPIUM BROMIDE AND ALBUTEROL SULFATE 3 ML: .5; 3 SOLUTION RESPIRATORY (INHALATION) at 08:00

## 2023-11-25 RX ADMIN — PANTOPRAZOLE SODIUM 40 MG: 40 TABLET, DELAYED RELEASE ORAL at 07:32

## 2023-11-25 RX ADMIN — Medication: at 07:00

## 2023-11-25 RX ADMIN — MEROPENEM 1 G: 1 INJECTION, POWDER, FOR SOLUTION INTRAVENOUS at 10:39

## 2023-11-25 RX ADMIN — ACETAMINOPHEN 650 MG: 325 TABLET ORAL at 09:34

## 2023-11-25 RX ADMIN — IPRATROPIUM BROMIDE AND ALBUTEROL SULFATE 3 ML: 2.5; .5 SOLUTION RESPIRATORY (INHALATION) at 19:31

## 2023-11-25 RX ADMIN — MEROPENEM 1 G: 1 INJECTION, POWDER, FOR SOLUTION INTRAVENOUS at 20:57

## 2023-11-25 RX ADMIN — CITALOPRAM HYDROBROMIDE 20 MG: 20 TABLET ORAL at 09:34

## 2023-11-25 RX ADMIN — OXYBUTYNIN CHLORIDE 5 MG: 5 TABLET ORAL at 09:35

## 2023-11-25 RX ADMIN — CYANOCOBALAMIN TAB 500 MCG 500 MCG: 500 TAB at 09:35

## 2023-11-25 RX ADMIN — BRIMONIDINE TARTRATE 1 DROP: 2 SOLUTION/ DROPS OPHTHALMIC at 20:56

## 2023-11-25 RX ADMIN — SODIUM CHLORIDE 1000 ML: 9 INJECTION, SOLUTION INTRAVENOUS at 00:07

## 2023-11-25 RX ADMIN — OXYBUTYNIN CHLORIDE 5 MG: 5 TABLET ORAL at 15:06

## 2023-11-25 RX ADMIN — HYPROMELLOSE 2910 1 DROP: 5 SOLUTION OPHTHALMIC at 09:35

## 2023-11-25 RX ADMIN — SODIUM CHLORIDE 75 ML/HR: 9 INJECTION, SOLUTION INTRAVENOUS at 17:33

## 2023-11-25 RX ADMIN — MELATONIN TAB 5 MG 5 MG: 5 TAB at 20:51

## 2023-11-25 RX ADMIN — VANCOMYCIN HYDROCHLORIDE 1250 MG: 1.25 INJECTION, POWDER, LYOPHILIZED, FOR SOLUTION INTRAVENOUS at 21:52

## 2023-11-25 SDOH — SOCIAL STABILITY: SOCIAL INSECURITY: DO YOU FEEL UNSAFE GOING BACK TO THE PLACE WHERE YOU ARE LIVING?: NO

## 2023-11-25 SDOH — SOCIAL STABILITY: SOCIAL INSECURITY: DOES ANYONE TRY TO KEEP YOU FROM HAVING/CONTACTING OTHER FRIENDS OR DOING THINGS OUTSIDE YOUR HOME?: NO

## 2023-11-25 SDOH — SOCIAL STABILITY: SOCIAL INSECURITY: WERE YOU ABLE TO COMPLETE ALL THE BEHAVIORAL HEALTH SCREENINGS?: YES

## 2023-11-25 SDOH — SOCIAL STABILITY: SOCIAL INSECURITY: ABUSE: ADULT

## 2023-11-25 SDOH — SOCIAL STABILITY: SOCIAL INSECURITY: HAS ANYONE EVER THREATENED TO HURT YOUR FAMILY OR YOUR PETS?: NO

## 2023-11-25 SDOH — SOCIAL STABILITY: SOCIAL INSECURITY: DO YOU FEEL ANYONE HAS EXPLOITED OR TAKEN ADVANTAGE OF YOU FINANCIALLY OR OF YOUR PERSONAL PROPERTY?: NO

## 2023-11-25 SDOH — SOCIAL STABILITY: SOCIAL INSECURITY: ARE YOU OR HAVE YOU BEEN THREATENED OR ABUSED PHYSICALLY, EMOTIONALLY, OR SEXUALLY BY ANYONE?: NO

## 2023-11-25 SDOH — SOCIAL STABILITY: SOCIAL INSECURITY: ARE THERE ANY APPARENT SIGNS OF INJURIES/BEHAVIORS THAT COULD BE RELATED TO ABUSE/NEGLECT?: NO

## 2023-11-25 SDOH — SOCIAL STABILITY: SOCIAL INSECURITY: HAVE YOU HAD THOUGHTS OF HARMING ANYONE ELSE?: NO

## 2023-11-25 ASSESSMENT — COGNITIVE AND FUNCTIONAL STATUS - GENERAL
DAILY ACTIVITIY SCORE: 10
HELP NEEDED FOR BATHING: A LOT
DRESSING REGULAR UPPER BODY CLOTHING: A LOT
TOILETING: TOTAL
STANDING UP FROM CHAIR USING ARMS: A LITTLE
WALKING IN HOSPITAL ROOM: A LOT
HELP NEEDED FOR BATHING: A LOT
DRESSING REGULAR UPPER BODY CLOTHING: A LOT
STANDING UP FROM CHAIR USING ARMS: A LITTLE
MOBILITY SCORE: 10
MOVING TO AND FROM BED TO CHAIR: A LOT
TOILETING: TOTAL
EATING MEALS: A LITTLE
PERSONAL GROOMING: TOTAL
TURNING FROM BACK TO SIDE WHILE IN FLAT BAD: TOTAL
EATING MEALS: A LITTLE
WALKING IN HOSPITAL ROOM: A LOT
DRESSING REGULAR LOWER BODY CLOTHING: TOTAL
MOBILITY SCORE: 10
DRESSING REGULAR LOWER BODY CLOTHING: TOTAL
MOVING TO AND FROM BED TO CHAIR: A LOT
PERSONAL GROOMING: TOTAL
TURNING FROM BACK TO SIDE WHILE IN FLAT BAD: TOTAL
MOVING FROM LYING ON BACK TO SITTING ON SIDE OF FLAT BED WITH BEDRAILS: TOTAL
CLIMB 3 TO 5 STEPS WITH RAILING: TOTAL
MOVING FROM LYING ON BACK TO SITTING ON SIDE OF FLAT BED WITH BEDRAILS: TOTAL
DAILY ACTIVITIY SCORE: 10
CLIMB 3 TO 5 STEPS WITH RAILING: TOTAL

## 2023-11-25 ASSESSMENT — PAIN SCALES - GENERAL
PAINLEVEL_OUTOF10: 0 - NO PAIN
PAINLEVEL_OUTOF10: 0 - NO PAIN
PAINLEVEL_OUTOF10: 3
PAINLEVEL_OUTOF10: 0 - NO PAIN

## 2023-11-25 ASSESSMENT — PATIENT HEALTH QUESTIONNAIRE - PHQ9
2. FEELING DOWN, DEPRESSED OR HOPELESS: NOT AT ALL
1. LITTLE INTEREST OR PLEASURE IN DOING THINGS: NOT AT ALL
SUM OF ALL RESPONSES TO PHQ9 QUESTIONS 1 & 2: 0

## 2023-11-25 ASSESSMENT — LIFESTYLE VARIABLES
PRESCIPTION_ABUSE_PAST_12_MONTHS: NO
HOW OFTEN DO YOU HAVE 6 OR MORE DRINKS ON ONE OCCASION: NEVER
SKIP TO QUESTIONS 9-10: 1
HOW OFTEN DO YOU HAVE A DRINK CONTAINING ALCOHOL: NEVER
AUDIT-C TOTAL SCORE: 0
SUBSTANCE_ABUSE_PAST_12_MONTHS: NO
AUDIT-C TOTAL SCORE: 0
HOW MANY STANDARD DRINKS CONTAINING ALCOHOL DO YOU HAVE ON A TYPICAL DAY: PATIENT DOES NOT DRINK

## 2023-11-25 ASSESSMENT — PAIN DESCRIPTION - DESCRIPTORS: DESCRIPTORS: ACHING

## 2023-11-25 ASSESSMENT — PAIN - FUNCTIONAL ASSESSMENT
PAIN_FUNCTIONAL_ASSESSMENT: 0-10

## 2023-11-25 ASSESSMENT — ACTIVITIES OF DAILY LIVING (ADL): LACK_OF_TRANSPORTATION: NO

## 2023-11-25 NOTE — PROGRESS NOTES
"Vancomycin Dosing by Pharmacy- FOLLOW UP    Ayse Cardoza is a 89 y.o. year old female who Pharmacy has been consulted for vancomycin dosing for pneumonia. Based on the patient's indication and renal status this patient is being dosed based on a goal AUC of 400-600.     Renal function is currently improving.    Current vancomycin dose: 1000 mg given every 24 hours    Estimated vancomycin AUC on current dose: 418 mg/L.hr     Visit Vitals  /72 (BP Location: Left arm)   Pulse 69   Temp 36 °C (96.8 °F) (Temporal)   Resp 18        Lab Results   Component Value Date    CREATININE 0.91 11/25/2023    CREATININE 1.03 11/24/2023    CREATININE 1.09 (H) 11/21/2023    CREATININE 0.99 11/20/2023        Patient weight is No results found for: \"PTWEIGHT\"    No results found for: \"CULTURE\"     I/O last 3 completed shifts:  In: 1712.5 (21 mL/kg) [I.V.:412.5 (5.1 mL/kg); IV Piggyback:1300]  Out: 203 (2.5 mL/kg) [Urine:203 (0.1 mL/kg/hr)]  Weight: 81.6 kg   [unfilled]    No results found for: \"PATIENTTEMP\"     Assessment/Plan    Within goal AUC range, however, at lower end of goal range with kidney function improving so could dip below the goal faviola of 400 mg/L.hr.   Will increase maintenance dose to 1250mg q24h  Vancomycin day #2 of therapy - MRSA PCR positive on 11/24/23    This dosing regimen is predicted by [employer] to result in the following pharmacokinetic parameters:  AUC24,ss: 516 mg/L.hr  Probability of AUC24 > 400: 89 %  Ctrough,ss: 15.2 mg/L  Probability of Ctrough,ss > 20: 16 %  Probability of nephrotoxicity (Lodise CB 2009): 10 %    The next level will be obtained on 11/27 at AM labs. May be obtained sooner if clinically indicated.   Will continue to monitor renal function daily while on vancomycin and order serum creatinine at least every 48 hours if not already ordered.  Follow for continued vancomycin needs, clinical response, and signs/symptoms of toxicity.       Jonny Kahn [facility]           "

## 2023-11-25 NOTE — PROGRESS NOTES
Occupational Therapy    Evaluation    Patient Name: Ayse Cardoza  MRN: [id number]  Today's Date: 11/25/2023  Time Calculation  Start Time: 0953  Stop Time: 1020  Time Calculation (min): 27 min    Assessment  IP OT Assessment  OT Assessment: Patient would benefit from further OT to address ADL's and functional transfers/mobility due to decline in baseline function.  Hospital admit 11/24/2023 due to multiple falls; found to have right SDH, acute hypoxic respiratory failure, metabolic encephalopathy  Prognosis: Good  Evaluation/Treatment Tolerance: Patient limited by fatigue, Patient limited by pain  End of Session Communication: Bedside nurse  End of Session Patient Position: Bed, 2 rail up  Plan:  Treatment Interventions: ADL retraining, Functional transfer training, Endurance training, Compensatory technique education, Patient/family training, Equipment evaluation/education  OT Frequency: 3 times per week  OT Discharge Recommendations: Moderate intensity level of continued care  OT - OK to Discharge: Yes (when medically cleared by physician/medical team)    Subjective   Current Problem:  1. Multiple falls        2. Hallucination        3. History of rib fracture        4. History of intracranial hemorrhage        5. Pneumonia of right lung due to infectious organism, unspecified part of lung          General:  General  Reason for Referral: ADL  Referred By: ELO Farias MD  Past Medical History Relevant to Rehab: anemia, CAD, HTN, HLD, GERD, DVT, asthma, venous insuff, arteritis, polymyalgia rheumatica, mild cognitive impairment  Prior to Session Communication: Bedside nurse  Patient Position Received: Bed, 2 rail up  General Comment: Patient seen in room 914B; cooperative, motivated.  Noted severe [place], hearing aid right ear    Precautions:  Medical Precautions: Fall precautions, Oxygen therapy device and L/min  Precautions Comment: 2L O2, UE IV     Pain:  Pain Assessment  Pain Assessment: 0-10  Pain Score: 0 - No  pain    Objective   Cognition:  Orientation Level: Disoriented to place     Home Living:  Type of Home: Assisted living  Lives With: Sp[facility] ([facility])  Bathroom Shower/Tub: Walk-in shower  Bathroom Toilet: Handicapped height  Bathroom Equipment: Grab bars in shower, Hand-held shower hose, Grab bars around toilet (bath seat)  Home Living Comments: sleeps in adjustable bed   Prior Function:  Level of [place]: Needs assistance with ADLs (including bathing/dressing; indep grooming, feeding)  Receives Help From:  (staff for all homemaking chores)  Ambulatory Assistance: Independent (using rollator)  Hand Dominance: Right  IADL History:     ADL:  ADL Comments: anticipate carryover assist for self-care including lower body dressing, bathing, toileting.  Would benefit from further addressing in OT sessions and trialing use of ADL adaptive equipment to facilitate indep and ease in performance.  Activity Tolerance:     Bed Mobility/Transfers: Bed Mobility  Bed Mobility: Yes  Bed Mobility 1  Bed Mobility 1: Supine to sitting, Sitting to supine  Level of Assistance 1: Dependent, Maximum verbal cues    Transfers  Transfer: Yes  Transfer 1  Transfer From 1: Sit to, Stand to  Transfer to 1: Bed  Transfer Device 1: Walker  Transfer Level of Assistance 1: Minimum assistance, Maximum verbal cues    Ambulation/Gait Training:  Ambulation/Gait Training  Ambulation/Gait Training Performed: Yes  Ambulation/Gait Training 1  Device 1: Rolling walker  Gait Support Devices: Gait belt  Assistance 1: Minimum assistance, Moderate assistance, Maximum verbal cues, Maximum tactile cues  Quality of Gait 1: Narrow base of support  Comments/Distance (ft) 1: performed few sidesteps along hospital bed  Sitting Balance:  Static Sitting Balance  Static Sitting-Level of Assistance: Close supervision  Standing Balance:  Static Standing Balance  Static Standing-Comment/Number of Minutes: fair    Sensation:  Light Touch:  (chronic  numbness/tingling at toes)     Extremities: RUE   RUE : Within Functional Limits (minimal AROM shoulder:  chronic limitation flex/abd ~30 degrees; elbow WFL; impaired hand MP's lack full extension with noted arthritic changes.) and LUE   LUE: Within Functional Limits (minimal AROM shoulder: chronic limitation flex/abd ~35 degrees; elbow WFL; impaired hand MP's lack full extension with noted arthritic changes.)    Outcome Measures: [facility] Daily Activity  Putting on and taking off regular lower body clothing: Total  Bathing (including washing, rinsing, drying): A lot  Putting on and taking off regular upper body clothing: A lot  Toileting, which includes using toilet, bedpan or urinal: Total  Taking care of personal grooming such as brushing teeth: Total  Eating Meals: A little  Daily Activity - Total Score: 10    Education Documentation  Body Mechanics, taught by Dior Rowell OT at 11/25/2023  1:43 PM.  Learner: Patient  Readiness: Acceptance  Method: Explanation  Response: Verbalizes Understanding  Comment: required step-by-step instructions for all mobility tasks    Goals:   Encounter Problems       Encounter Problems (Active)       ADL/transfers/standing       Patient with complete upper body groomin/simple hygiene with modified independence using adaptive equipment as needed        Start:  11/25/23    Expected End:  12/02/23            Patient will perform bed mobility and functional transfers safely and SBA: bed, chair, commode using DME as needed        Start:  11/25/23    Expected End:  12/02/23            Patient will tolerate standing for 8 mins. and show good (-) standing balance during ADL's and functional transfers/mobility        Start:  11/25/23    Expected End:  12/02/23            apply energy conservation/diaphragmatic breathing techniques to ADL's and functional transfers with minimal cues        Start:  11/25/23    Expected End:  12/02/23

## 2023-11-25 NOTE — PROGRESS NOTES
Physical Therapy  Physical Therapy    Physical Therapy Evaluation    Patient Name: Ayse Cardoza  MRN: [id number]  Today's Date: 11/25/2023   Time Calculation  Start Time: 0952  Stop Time: 1020  Time Calculation (min): 28 min    Assessment/Plan   PT Assessment  End of Session Communication: Bedside nurse  End of Session Patient Position: Alarm on  IP OR SWING BED PT PLAN  Inpatient or Swing Bed: Inpatient  PT Plan  Treatment/Interventions: Bed mobility, Transfer training, Gait training, Strengthening  PT Plan: Skilled PT  PT Frequency: 3 times per week  PT Discharge Recommendations: Moderate intensity level of continued care  PT - OK to Discharge: Yes          General Visit Information:  General  Reason for Referral:  (mult falls, r sdh, pna, metabolic endcephalopathy, recent discharge 11/22/23,)  Past Medical History Relevant to Rehab:  (anemia, venous insuff, dvt, aeteritis, polymyalgia, rheumatica, mild cognitive impairment, gerd, anemia, cad)  Family/Caregiver Present: No  Prior to Session Communication: Bedside nurse  Patient Position Received: Bed, 2 rail up    Home Living:  Home Living  Type of Home: Assisted living  Lives With: Spouse  Home Adaptive Equipment:  (uses rollator)    Prior Level of Function:  Prior Function Per Pt/Caregiver Report  Level of [place]:  (indep fxal mob)    Precautions:  Precautions  Precautions Comment:  (falls, 2 l o2, [place], r hearing aide)    Vital Signs:     Objective     Pain:  Pain Assessment  Pain Assessment:  (0/10 pain at rest, appears in pain w/ fxal mob not rated)    Cognition:  Cognition  Overall Cognitive Status:  (ox2, not place)    General Assessments:      Activity Tolerance  Endurance:  (fair)  Sensation  Sensation Comment:  (n&t toes)                      Functional Assessments:     Bed Mobility  Bed Mobility:  (dep of 2)  Transfers  Transfer:  (min of 1)  Ambulation/Gait Training  Ambulation/Gait Training Performed:  (side step w/ rw along side of bed w/  mod/min of 2, , weak, decreased and balance and high risk)          Extremity/Trunk Assessments:        RLE   RLE :  (grossly 3+/5 or greater)  LLE   LLE :  (grossly 3+/5 or greater)    Outcome Measures:  [facility] Basic Mobility  Turning from your back to your side while in a flat bed without using bedrails: Total  Moving from lying on your back to sitting on the side of a flat bed without using bedrails: Total  Moving to and from bed to chair (including a wheelchair): A lot  Standing up from a chair using your arms (e.g. wheelchair or bedside chair): A little  To walk in hospital room: A lot  Climbing 3-5 steps with railing: Total  Basic Mobility - Total Score: 10                            Goals:  Encounter Problems       Encounter Problems (Active)       PT Problem       PT Goal 1 (Progressing)       Start:  11/25/23    Expected End:  12/09/23       Min of 1 bed mob          PT Goal 2 (Progressing)       Start:  11/25/23    Expected End:  12/09/23       Min of 1 txs          PT Goal 3 (Progressing)       Start:  11/25/23    Expected End:  12/09/23       20-30 reps ble there ex         PT Goal 4 (Progressing)       Start:  11/25/23    Expected End:  12/09/23       Min f 1 amb w/ rw 100ft x2              Education Documentation  Mobility Training, taught by Nydia Crawford, PT at 11/25/2023  1:18 PM.  Learner: Patient  Readiness: Acceptance  Method: Explanation  Response: Verbalizes Understanding    Education Comments  No comments found.

## 2023-11-25 NOTE — CARE PLAN
Problem: Safety  Goal: Patient will be injury free during hospitalization  Outcome: Progressing  Goal: I will remain free of falls  Outcome: Progressing     Problem: Daily Care  Goal: Daily care needs are met  Outcome: Progressing     Problem: Psychosocial Needs  Goal: Demonstrates ability to cope with hospitalization/illness  Outcome: Progressing  Goal: Collaborate with me, my family, and caregiver to identify my specific goals  Outcome: Progressing     Problem: Skin  Goal: Prevent/manage excess moisture  Outcome: Progressing     Problem: Skin  Goal: Prevent/minimize sheer/friction injuries  Outcome: Progressing     Problem: Infection related to problem list condition  Goal: Infection will resolve through treatment  Outcome: Progressing     Problem: Respiratory  Goal: No signs of respiratory distress (eg. Use of accessory muscles. Peds grunting)  Outcome: Progressing  Goal: Wean oxygen to maintain O2 saturation per order/standard this shift  Outcome: Progressing         The patient's goals for the shift include no falls    The clinical goals for the shift include pt will have improvement in BP

## 2023-11-25 NOTE — PROGRESS NOTES
"Hospital Medicine Progress Note    Patient Name: Ayse Cardoza   YOB: 1934    Subjective:  Ayse Cardoza is a 89 y.o. female, who is hospital day 1.     Patient denies any specific complaints this am, is sleepy but awakens easily. Confused. BP soft overnight, started on gentle IVFs. Son &  updated at bedside this afternoon.     Objective:    BP 95/53   Pulse 67   Temp 36.3 °C (97.3 °F)   Resp 18   Ht 1.651 m (5' 5\")   Wt 81.6 kg (180 lb)   SpO2 93%   BMI 29.95 kg/m²     Physical Exam:  GENERAL: A&Ox2, no distress, alert and cooperative  HEENT: Normocephalic, EOMI, clear sclera, ecchymoses of face, MMM  CARDIOVASCULAR: Regular rate and rhythm, no murmurs, rubs, or gallops. S1/S2  RESPIRATORY: CTAB, no wheezes, rales or rhonchi. No distress  ABDOMEN: Soft, ND, non-tender. No rebound or guarding. BS+  EXTREMITIES: Mild peripheral edema, scattered ecchymoses on extremities  NEUROLOGICAL: No focal deficits, some confusion present  SKIN: Warm and dry, no rashes  PSYCH: appropriate mood and affect    Assessment/Plan:    Acute Hypoxic Respiratory Failure  Health Care Associated Pneumonia  Nitrite positive UTI  Metabolic Encephalopathy in setting of infection  Hypotension  Subdural hematoma- stable on imaging  Recurrent Falls, Ambulatory Dysfunction, Debility    HTN, HLD  CAD  Asthma  Anemia  Venous insufficiency  h/o DVT (not on anticoagulation)  Polymyalgia rheumatica  Mild cognitive impairment  GERD     Given patient's recent hospital admission, will treat for HCAP. Continue Vanc & Merrem day 2. Will consult Pulm as well. Wean O2 as tolerated, currently on 2L NC. Duone[place] PRN and scheduled.  Follow up urine culture. MRSA screen+, urinary legionella & strep pneumo ags negative  Repeat imaging showed no progression of subdural hematoma  PT/OT, Fall Precautions, gentle IVFs  Continue home meds holding hydrochlorothiazide in setting of hypotension. PRN seroquel at bedtime    DVT Prophylaxis: " SCDs only  Code Status: DNR Comfort Measures Only, son POA  Disposition: Med/ surg no tele    Joelle Schultz, [facility]

## 2023-11-26 LAB
ANION GAP SERPL CALC-SCNC: 9 MMOL/L (ref 10–20)
BACTERIA UR CULT: NORMAL
BUN SERPL-MCNC: 27 MG/DL (ref 6–23)
CALCIUM SERPL-MCNC: 8.2 MG/DL (ref 8.6–10.3)
CHLORIDE SERPL-SCNC: 102 MMOL/L (ref 98–107)
CO2 SERPL-SCNC: 26 MMOL/L (ref 21–32)
CREAT SERPL-MCNC: 0.83 MG/DL (ref 0.5–1.05)
ERYTHROCYTE [DISTWIDTH] IN BLOOD BY AUTOMATED COUNT: 15.6 % (ref 11.5–14.5)
GFR SERPL CREATININE-BSD FRML MDRD: 67 ML/MIN/1.73M*2
GLUCOSE SERPL-MCNC: 80 MG/DL (ref 74–99)
HCT VFR BLD AUTO: 31.4 % (ref 36–46)
HGB BLD-MCNC: 10.2 G/DL (ref 12–16)
MAGNESIUM SERPL-MCNC: 1.9 MG/DL (ref 1.6–2.4)
MCH RBC QN AUTO: 30.7 PG (ref 26–34)
MCHC RBC AUTO-ENTMCNC: 32.5 G/DL (ref 32–36)
MCV RBC AUTO: 95 FL (ref 80–100)
NRBC BLD-RTO: 0 /100 WBCS (ref 0–0)
PLATELET # BLD AUTO: 158 X10*3/UL (ref 150–450)
POTASSIUM SERPL-SCNC: 3.5 MMOL/L (ref 3.5–5.3)
RBC # BLD AUTO: 3.32 X10*6/UL (ref 4–5.2)
SODIUM SERPL-SCNC: 133 MMOL/L (ref 136–145)
WBC # BLD AUTO: 8.3 X10*3/UL (ref 4.4–11.3)

## 2023-11-26 PROCEDURE — 80048 BASIC METABOLIC PNL TOTAL CA: CPT | Performed by: INTERNAL MEDICINE

## 2023-11-26 PROCEDURE — 2500000004 HC RX 250 GENERAL PHARMACY W/ HCPCS (ALT 636 FOR OP/ED): Performed by: STUDENT IN AN ORGANIZED HEALTH CARE EDUCATION/TRAINING PROGRAM

## 2023-11-26 PROCEDURE — 2500000005 HC RX 250 GENERAL PHARMACY W/O HCPCS: Performed by: INTERNAL MEDICINE

## 2023-11-26 PROCEDURE — 83735 ASSAY OF MAGNESIUM: CPT | Performed by: INTERNAL MEDICINE

## 2023-11-26 PROCEDURE — 1210000001 HC SEMI-PRIVATE ROOM DAILY

## 2023-11-26 PROCEDURE — 2500000001 HC RX 250 WO HCPCS SELF ADMINISTERED DRUGS (ALT 637 FOR MEDICARE OP)

## 2023-11-26 PROCEDURE — 2500000004 HC RX 250 GENERAL PHARMACY W/ HCPCS (ALT 636 FOR OP/ED): Performed by: INTERNAL MEDICINE

## 2023-11-26 PROCEDURE — 85027 COMPLETE CBC AUTOMATED: CPT | Performed by: INTERNAL MEDICINE

## 2023-11-26 PROCEDURE — 2500000002 HC RX 250 W HCPCS SELF ADMINISTERED DRUGS (ALT 637 FOR MEDICARE OP, ALT 636 FOR OP/ED): Performed by: INTERNAL MEDICINE

## 2023-11-26 PROCEDURE — 99233 SBSQ HOSP IP/OBS HIGH 50: CPT | Performed by: INTERNAL MEDICINE

## 2023-11-26 PROCEDURE — 94640 AIRWAY INHALATION TREATMENT: CPT

## 2023-11-26 PROCEDURE — 36415 COLL VENOUS BLD VENIPUNCTURE: CPT | Performed by: INTERNAL MEDICINE

## 2023-11-26 PROCEDURE — 2500000004 HC RX 250 GENERAL PHARMACY W/ HCPCS (ALT 636 FOR OP/ED)

## 2023-11-26 RX ORDER — BUDESONIDE 0.25 MG/2ML
0.25 INHALANT ORAL
Status: DISCONTINUED | OUTPATIENT
Start: 2023-11-26 | End: 2023-11-30 | Stop reason: HOSPADM

## 2023-11-26 RX ORDER — LIDOCAINE 560 MG/1
1 PATCH PERCUTANEOUS; TOPICAL; TRANSDERMAL DAILY
Status: DISCONTINUED | OUTPATIENT
Start: 2023-11-26 | End: 2023-11-30 | Stop reason: HOSPADM

## 2023-11-26 RX ORDER — HYDRALAZINE HYDROCHLORIDE 20 MG/ML
10 INJECTION INTRAMUSCULAR; INTRAVENOUS EVERY 6 HOURS PRN
Status: DISCONTINUED | OUTPATIENT
Start: 2023-11-26 | End: 2023-11-30 | Stop reason: HOSPADM

## 2023-11-26 RX ORDER — HYDRALAZINE HYDROCHLORIDE 20 MG/ML
5 INJECTION INTRAMUSCULAR; INTRAVENOUS EVERY 8 HOURS PRN
Status: DISCONTINUED | OUTPATIENT
Start: 2023-11-26 | End: 2023-11-26

## 2023-11-26 RX ORDER — GUAIFENESIN 600 MG/1
600 TABLET, EXTENDED RELEASE ORAL 2 TIMES DAILY
Status: DISCONTINUED | OUTPATIENT
Start: 2023-11-26 | End: 2023-11-30 | Stop reason: HOSPADM

## 2023-11-26 RX ADMIN — LIDOCAINE 1 PATCH: 4 PATCH TOPICAL at 13:14

## 2023-11-26 RX ADMIN — LATANOPROST 1 DROP: 50 SOLUTION OPHTHALMIC at 20:39

## 2023-11-26 RX ADMIN — CITALOPRAM HYDROBROMIDE 20 MG: 20 TABLET ORAL at 10:00

## 2023-11-26 RX ADMIN — MEROPENEM 1 G: 1 INJECTION, POWDER, FOR SOLUTION INTRAVENOUS at 20:29

## 2023-11-26 RX ADMIN — GUAIFENESIN 600 MG: 600 TABLET, EXTENDED RELEASE ORAL at 10:20

## 2023-11-26 RX ADMIN — OXYBUTYNIN CHLORIDE 5 MG: 5 TABLET ORAL at 14:49

## 2023-11-26 RX ADMIN — VANCOMYCIN HYDROCHLORIDE 1250 MG: 1.25 INJECTION, POWDER, LYOPHILIZED, FOR SOLUTION INTRAVENOUS at 22:00

## 2023-11-26 RX ADMIN — IPRATROPIUM BROMIDE AND ALBUTEROL SULFATE 3 ML: 2.5; .5 SOLUTION RESPIRATORY (INHALATION) at 19:17

## 2023-11-26 RX ADMIN — CYANOCOBALAMIN TAB 500 MCG 500 MCG: 500 TAB at 10:00

## 2023-11-26 RX ADMIN — BRIMONIDINE TARTRATE 1 DROP: 2 SOLUTION/ DROPS OPHTHALMIC at 09:59

## 2023-11-26 RX ADMIN — SODIUM CHLORIDE 75 ML/HR: 9 INJECTION, SOLUTION INTRAVENOUS at 09:23

## 2023-11-26 RX ADMIN — OXYBUTYNIN CHLORIDE 5 MG: 5 TABLET ORAL at 20:29

## 2023-11-26 RX ADMIN — IPRATROPIUM BROMIDE AND ALBUTEROL SULFATE 3 ML: 2.5; .5 SOLUTION RESPIRATORY (INHALATION) at 11:56

## 2023-11-26 RX ADMIN — GUAIFENESIN 600 MG: 600 TABLET, EXTENDED RELEASE ORAL at 20:29

## 2023-11-26 RX ADMIN — OXYBUTYNIN CHLORIDE 5 MG: 5 TABLET ORAL at 10:00

## 2023-11-26 RX ADMIN — PANTOPRAZOLE SODIUM 40 MG: 40 TABLET, DELAYED RELEASE ORAL at 06:07

## 2023-11-26 RX ADMIN — ACETAMINOPHEN 650 MG: 325 TABLET ORAL at 20:30

## 2023-11-26 RX ADMIN — POLYETHYLENE GLYCOL 3350 17 G: 17 POWDER, FOR SOLUTION ORAL at 09:59

## 2023-11-26 RX ADMIN — IPRATROPIUM BROMIDE AND ALBUTEROL SULFATE 3 ML: 2.5; .5 SOLUTION RESPIRATORY (INHALATION) at 06:49

## 2023-11-26 RX ADMIN — HYPROMELLOSE 2910 1 DROP: 5 SOLUTION OPHTHALMIC at 09:59

## 2023-11-26 RX ADMIN — MEROPENEM 1 G: 1 INJECTION, POWDER, FOR SOLUTION INTRAVENOUS at 09:58

## 2023-11-26 RX ADMIN — BRIMONIDINE TARTRATE 1 DROP: 2 SOLUTION/ DROPS OPHTHALMIC at 20:38

## 2023-11-26 RX ADMIN — MELATONIN TAB 5 MG 5 MG: 5 TAB at 20:29

## 2023-11-26 ASSESSMENT — COGNITIVE AND FUNCTIONAL STATUS - GENERAL
PERSONAL GROOMING: TOTAL
DRESSING REGULAR UPPER BODY CLOTHING: A LOT
HELP NEEDED FOR BATHING: A LOT
EATING MEALS: A LITTLE
DRESSING REGULAR LOWER BODY CLOTHING: TOTAL
MOVING TO AND FROM BED TO CHAIR: A LOT
TOILETING: TOTAL
DAILY ACTIVITIY SCORE: 10
MOBILITY SCORE: 9
CLIMB 3 TO 5 STEPS WITH RAILING: TOTAL
STANDING UP FROM CHAIR USING ARMS: A LOT
WALKING IN HOSPITAL ROOM: A LOT
MOVING FROM LYING ON BACK TO SITTING ON SIDE OF FLAT BED WITH BEDRAILS: TOTAL
TURNING FROM BACK TO SIDE WHILE IN FLAT BAD: TOTAL

## 2023-11-26 ASSESSMENT — PAIN DESCRIPTION - LOCATION
LOCATION: CHEST
LOCATION: CHEST

## 2023-11-26 ASSESSMENT — PAIN SCALES - GENERAL
PAINLEVEL_OUTOF10: 0 - NO PAIN
PAINLEVEL_OUTOF10: 4
PAINLEVEL_OUTOF10: 0 - NO PAIN

## 2023-11-26 ASSESSMENT — PAIN DESCRIPTION - ORIENTATION
ORIENTATION: LEFT;OUTER
ORIENTATION: LEFT

## 2023-11-26 NOTE — CARE PLAN
The patient's goals for the shift include no falls    The clinical goals for the shift include will rest despite uncooperative roommate    Over the shift, the patient did not make progress toward the following goals. Barriers to progression include ***. Recommendations to address these barriers include ***.

## 2023-11-26 NOTE — CONSULTS
Assessment and Plan  Patient is 89 y.o. female  with the following medical Problems:  Acute hypoxic respiratory failure  S/P recurrent mechanical falls  Multiple right-sided ribs fracture    Plan of Care:  CT scan findings from November 20, 2023 showed findings most consistent with aspiration pneumonitis  Continue with Merrem and consider discontinuing vancomycin . MRSA swab is n=ve.  Speech therapy evaluation  Follow-up CT scan in 6 months  Supplemental oxygen to keep sat 88 to 94%  Incentive spirometer      History of Present Illness:   Ayse Cardoza is a 89 y.o. female with past medical history of HTN, HLD, CAD, asthma, anemia, venous insufficiency, h/o DVT (not on anticoagulation), arteritis, polymyalgia rheumatica, glaucoma, mild cognitive impairment, GERD who presented to [facility] ED on 11/14/23 following a fall. Of note, the patient was recently admitted to the hospital for similar presentation and discharged on 11/22/23. Patient was found to have right subdural hematoma (2-3mm). No surgical interventional was performed and hematoma was seen as stable on repeat imaging.      Patient was discharged on Wednesday 11/22. History supplemented by Son/POA due to patient's altered mentation. Per Son, since being discharged the patient mental status has been progressively worsening. He mentions that she has also been having visual hallucinations and being more lethargic. He mentions that the patient has behaving strangely as well and mentions that she did not recognize her  of 60 years. They were prompted to come to the ED after the patient had fallen a second time. She had attempted to get up from bed at night when she fell. The patient mentions that she felt like she tripped on something. Her  was in the room; although, did not see her fall due to it being dark. The patient denies hitting her head, syncope, or palpitations. She mentions that she has been coughing more over the last week with no  sputum production.      In the ED, patient was noted to be wheezy on exam and placed on 2L NC. Vitals were significant for SpO2 100% on 2L, /64. Labs significant for for BUN 31, WBC 12.7. CXR appears to show a right sided pneumonia. Patient was given albuterol and IV doxycycline in the ED. Patient was also taken for CT Head and C-spine with result not currently read.        Past Medical History: As stated above     Past Surgical History: As stated above.     Social History: Patient currently lives in assisted living with .     Family History: Noncontributory       Past Medical/Surgical History:   Past Medical History:   Diagnosis Date    Anemia     Asthma     Chronic hyponatremia     CKD (chronic kidney disease)     Depression     HTN (hypertension)     MCI (mild cognitive impairment)     Polymyalgia rheumatica (CMS/HCC)     SDH (subdural hematoma) (CMS/HCC) 11/20/2023    Stroke (cerebrum) (CMS/HCC)     White coat syndrome with hypertension      Past Surgical History:   Procedure Laterality Date    APPENDECTOMY      CARPAL TUNNEL RELEASE  05/27/2014    Neuroplasty Decompression Median Nerve At Carpal Tunnel    CT ANGIO NECK  12/19/2018    CT NECK ANGIO W AND WO IV CONTRAST 12/19/2018 [facility]    CT HEAD ANGIO W AND WO IV CONTRAST  12/19/2018    CT HEAD ANGIO W AND WO IV CONTRAST 12/19/2018 [facility]    KNEE SURGERY Bilateral 05/27/2014    LUMBAR LAMINECTOMY      TOTAL HIP ARTHROPLASTY Bilateral        Family History:   No relevant family history has been documented for this patient.    Allergies:     Allergies   Allergen Reactions    Fenofibrate Myalgia    Statins-Hmg-Coa Reductase Inhibitors Myalgia    Cefazolin Rash    Gabapentin Other     felt goofy on it    Lisinopril Cough    Penicillins Rash         Social history:   reports that she has never smoked. She has never used smokeless tobacco. She reports current alcohol use. She reports that she does not currently use  drugs.    Current Medications:   No recently discontinued medications to reconcile    Review of Systems:   General: denies weight gain, denies loss of appetite, fever, chills, night sweats.  HEENT: denies headaches, dizziness, head trauma, visual changes, eye pain, tinnitus, nosebleeds, hoarseness or throat pain    Respiratory: +ve chest pain, dyspnea, cough but no hemoptysis  Cardiovascular: denies orthopnea, paroxysmal nocturnal dyspnea, leg swelling, and previous heart attack.    Gastrointestinal: denies pain, nausea vomiting, diarrhea, constipation, melena or bleeding.  Genitourinary: denies hematuria, frequency, urgency or dysuria  Neurology: denies syncope, seizures, paralysis, paraesthesia   Endocrine: denies polyuria, polydipsia, skin or hair changes, and heat or cold intolerance  Musculoskeletal: denies joint pain, swelling, arthritis or myalgia  Hematologic: denies bleeding, adenopathy and easy bruising  Skin: denies rashes and skin discoloration  Psychiatry: denies depression    Physical Exam:   Vital Signs:   Vitals:    11/25/23 1942   BP: 176/74   Pulse: 73   Resp: 22   Temp: 36.2 °C (97.2 °F)   SpO2: 98%       Input/Output:    Intake/Output Summary (Last 24 hours) at 11/25/2023 2011  Last data filed at 11/25/2023 1733  Gross per 24 hour   Intake 2616.25 ml   Output 200 ml   Net 2416.25 ml       Oxygen requirements:    Ventilator Information:  FiO2 (%):  [28 %] 28 %          General appearance: Not in pain or distress, in no respiratory distress    HEENT: Atraumatic/normocephalic, EOMI, DAMIÁN, pharynx clear, moist mucosa, redness of the uvula appreciated,   Neck: Supple, no jugular venous distension, lymphadenopathy, thyromegaly or carotid bruits  Chest: Decreased breath sounds, no wheezing, +ve crackles and +VE tenderness over ribs   Cardiovascular: Normal S1, S2, regular rate and rhythm, no murmur, rub or gallop  Abdomen: Normal sounds present, soft, lax with no tenderness, no hepatosplenomegaly, and  no masses  Extremities: No edema. Pulses are equally present.   Skin: intact, no rashes   Neurologic: Alert and oriented x 1-2, confused, No focal deficit     Investigations:  Labs, radiological imaging and cardiac work up were personally reviewed          .       STAFF PHYSICIAN NOTE OF PERSONAL INVOLVEMENT IN CARE  As the attending physician, I certify that I personally reviewed the patient's history and personally examined the patient to confirm the physical findings described above, and that I reviewed the relevant imaging studies and available reports.  I also discussed the differential diagnosis and all of the proposed management plans with the patient and individuals accompanying the patient to this visit.  They had the opportunity to ask questions about the proposed management plans and to have those questions answered.

## 2023-11-26 NOTE — PROGRESS NOTES
"Hospital Medicine Progress Note    Patient Name: Ayse Cardoza   YOB: 1934    Subjective:  Ayse Cardoza is a 89 y.o. female, who is hospital day 2.     Patient c/o mostly dry cough but feels congested and like she is unable to fully bring up the phlegm. She denies any CP, does feel more sob than baseline. Her O2 was weaned earlier but she desatted to 85% on room air and was placed back on 2L NC. She denies any pain or other complaints.    Objective:    BP (!) 190/74   Pulse 76   Temp 36.3 °C (97.3 °F)   Resp 22   Ht 1.651 m (5' 5\")   Wt 81.6 kg (180 lb)   SpO2 94%   BMI 29.95 kg/m²     Physical Exam:  GENERAL: A&Ox2, no distress, alert and cooperative  HEENT: Normocephalic, EOMI, clear sclera, ecchymoses of face, oral mucosa dry  CARDIOVASCULAR: Regular rate and rhythm, no murmurs, rubs, or gallops. S1/S2  RESPIRATORY: BL faint wheezes, no rales or rhonchi. No distress  ABDOMEN: Soft, ND, non-tender. No rebound or guarding. BS+  EXTREMITIES: 2+ peripheral edema, scattered ecchymoses on extremities  NEUROLOGICAL: No focal deficits, some confusion   SKIN: Warm and dry, no rashes  PSYCH: appropriate mood and affect    Assessment/Plan:    Acute Hypoxic Respiratory Failure  Aspiration pneumonitis  Nitrite positive UTI  Acute Metabolic Encephalopathy 2/2 above  Right subdural hematoma, stable  Recurrent Falls, Ambulatory Dysfunction, Debility  Multiple R sided rib fractures    HTN, HLD  CAD  Asthma  Anemia  Venous insufficiency  h/o DVT (not on anticoagulation)  Polymyalgia rheumatica  Mild cognitive impairment  GERD     Vanc & Merrem day 3. Consult Pulm, imaging more consistent with aspiration pneumonitis over HAP, SLP arti, currently on 2L NC, wean to maintain SpO2 88-94%, gogo Yuen, IS, supportive care, will need repeat CT in 6 mos  Urine culture no significant growth. MRSA screen+, urinary legionella & strep pneumo ags negative  Repeat imaging showed no progression of subdural " hematoma, pt to remain off AC/ asa for 2 weeks per dc summary on 11/22  PT/OT, Fall Precautions, stop IVFs  Continue home meds, resume HCTZ, BP variable. PRN seroquel at bedtime    DVT Prophylaxis: SCDs only  Code Status: DNR Comfort Measures Only, son POA  Disposition: Med/ surg no tele, dc planning will likely need SNF as pt is from assisted living     Joelle Schultz [facility]

## 2023-11-27 LAB
ANION GAP SERPL CALC-SCNC: 11 MMOL/L (ref 10–20)
BUN SERPL-MCNC: 22 MG/DL (ref 6–23)
CALCIUM SERPL-MCNC: 8.3 MG/DL (ref 8.6–10.3)
CHLORIDE SERPL-SCNC: 102 MMOL/L (ref 98–107)
CO2 SERPL-SCNC: 25 MMOL/L (ref 21–32)
CREAT SERPL-MCNC: 0.74 MG/DL (ref 0.5–1.05)
ERYTHROCYTE [DISTWIDTH] IN BLOOD BY AUTOMATED COUNT: 15.7 % (ref 11.5–14.5)
GFR SERPL CREATININE-BSD FRML MDRD: 77 ML/MIN/1.73M*2
GLUCOSE SERPL-MCNC: 93 MG/DL (ref 74–99)
HCT VFR BLD AUTO: 32.9 % (ref 36–46)
HGB BLD-MCNC: 11.1 G/DL (ref 12–16)
M PNEUMO IGM SER IA-ACNC: 0.22 U/L
MAGNESIUM SERPL-MCNC: 1.7 MG/DL (ref 1.6–2.4)
MCH RBC QN AUTO: 31.2 PG (ref 26–34)
MCHC RBC AUTO-ENTMCNC: 33.7 G/DL (ref 32–36)
MCV RBC AUTO: 92 FL (ref 80–100)
NRBC BLD-RTO: 0 /100 WBCS (ref 0–0)
PLATELET # BLD AUTO: 186 X10*3/UL (ref 150–450)
POTASSIUM SERPL-SCNC: 3.9 MMOL/L (ref 3.5–5.3)
RBC # BLD AUTO: 3.56 X10*6/UL (ref 4–5.2)
SODIUM SERPL-SCNC: 134 MMOL/L (ref 136–145)
STAPHYLOCOCCUS SPEC CULT: ABNORMAL
VANCOMYCIN SERPL-MCNC: 22.6 UG/ML (ref 5–20)
WBC # BLD AUTO: 8.7 X10*3/UL (ref 4.4–11.3)

## 2023-11-27 PROCEDURE — 36415 COLL VENOUS BLD VENIPUNCTURE: CPT | Performed by: INTERNAL MEDICINE

## 2023-11-27 PROCEDURE — 2500000004 HC RX 250 GENERAL PHARMACY W/ HCPCS (ALT 636 FOR OP/ED): Performed by: INTERNAL MEDICINE

## 2023-11-27 PROCEDURE — 85027 COMPLETE CBC AUTOMATED: CPT | Performed by: INTERNAL MEDICINE

## 2023-11-27 PROCEDURE — 92610 EVALUATE SWALLOWING FUNCTION: CPT | Mod: GN | Performed by: IN HOME SUPPORTIVE CARE

## 2023-11-27 PROCEDURE — 80202 ASSAY OF VANCOMYCIN: CPT | Performed by: INTERNAL MEDICINE

## 2023-11-27 PROCEDURE — 2500000005 HC RX 250 GENERAL PHARMACY W/O HCPCS: Performed by: INTERNAL MEDICINE

## 2023-11-27 PROCEDURE — 94640 AIRWAY INHALATION TREATMENT: CPT

## 2023-11-27 PROCEDURE — 80048 BASIC METABOLIC PNL TOTAL CA: CPT | Performed by: INTERNAL MEDICINE

## 2023-11-27 PROCEDURE — 2500000001 HC RX 250 WO HCPCS SELF ADMINISTERED DRUGS (ALT 637 FOR MEDICARE OP)

## 2023-11-27 PROCEDURE — 99232 SBSQ HOSP IP/OBS MODERATE 35: CPT

## 2023-11-27 PROCEDURE — 1210000001 HC SEMI-PRIVATE ROOM DAILY

## 2023-11-27 PROCEDURE — 83735 ASSAY OF MAGNESIUM: CPT | Performed by: INTERNAL MEDICINE

## 2023-11-27 PROCEDURE — 2500000002 HC RX 250 W HCPCS SELF ADMINISTERED DRUGS (ALT 637 FOR MEDICARE OP, ALT 636 FOR OP/ED): Performed by: INTERNAL MEDICINE

## 2023-11-27 PROCEDURE — 2500000004 HC RX 250 GENERAL PHARMACY W/ HCPCS (ALT 636 FOR OP/ED)

## 2023-11-27 RX ORDER — MEROPENEM 1 G/1
1 INJECTION, POWDER, FOR SOLUTION INTRAVENOUS EVERY 8 HOURS
Status: DISCONTINUED | OUTPATIENT
Start: 2023-11-27 | End: 2023-11-27

## 2023-11-27 RX ORDER — AMLODIPINE BESYLATE 5 MG/1
5 TABLET ORAL DAILY
Status: DISCONTINUED | OUTPATIENT
Start: 2023-11-27 | End: 2023-11-30 | Stop reason: HOSPADM

## 2023-11-27 RX ORDER — MAGNESIUM SULFATE HEPTAHYDRATE 40 MG/ML
4 INJECTION, SOLUTION INTRAVENOUS ONCE
Status: COMPLETED | OUTPATIENT
Start: 2023-11-27 | End: 2023-11-28

## 2023-11-27 RX ADMIN — ACETAMINOPHEN 650 MG: 325 TABLET ORAL at 05:20

## 2023-11-27 RX ADMIN — LATANOPROST 1 DROP: 50 SOLUTION OPHTHALMIC at 20:27

## 2023-11-27 RX ADMIN — HYDRALAZINE HYDROCHLORIDE 10 MG: 20 INJECTION INTRAMUSCULAR; INTRAVENOUS at 08:46

## 2023-11-27 RX ADMIN — PANTOPRAZOLE SODIUM 40 MG: 40 TABLET, DELAYED RELEASE ORAL at 08:45

## 2023-11-27 RX ADMIN — PANTOPRAZOLE SODIUM 40 MG: 40 TABLET, DELAYED RELEASE ORAL at 05:20

## 2023-11-27 RX ADMIN — OXYBUTYNIN CHLORIDE 5 MG: 5 TABLET ORAL at 08:41

## 2023-11-27 RX ADMIN — ACETAMINOPHEN 650 MG: 325 TABLET ORAL at 13:08

## 2023-11-27 RX ADMIN — GUAIFENESIN 600 MG: 600 TABLET, EXTENDED RELEASE ORAL at 20:30

## 2023-11-27 RX ADMIN — MAGNESIUM SULFATE HEPTAHYDRATE 4 G: 40 INJECTION, SOLUTION INTRAVENOUS at 20:27

## 2023-11-27 RX ADMIN — IPRATROPIUM BROMIDE AND ALBUTEROL SULFATE 3 ML: 2.5; .5 SOLUTION RESPIRATORY (INHALATION) at 19:36

## 2023-11-27 RX ADMIN — CYANOCOBALAMIN TAB 500 MCG 500 MCG: 500 TAB at 08:41

## 2023-11-27 RX ADMIN — HYDROCHLOROTHIAZIDE 12.5 MG: 12.5 TABLET ORAL at 08:41

## 2023-11-27 RX ADMIN — OXYBUTYNIN CHLORIDE 5 MG: 5 TABLET ORAL at 14:09

## 2023-11-27 RX ADMIN — IPRATROPIUM BROMIDE AND ALBUTEROL SULFATE 3 ML: 2.5; .5 SOLUTION RESPIRATORY (INHALATION) at 11:25

## 2023-11-27 RX ADMIN — HYPROMELLOSE 2910 1 DROP: 5 SOLUTION OPHTHALMIC at 08:45

## 2023-11-27 RX ADMIN — OXYBUTYNIN CHLORIDE 5 MG: 5 TABLET ORAL at 20:31

## 2023-11-27 RX ADMIN — IPRATROPIUM BROMIDE AND ALBUTEROL SULFATE 3 ML: 2.5; .5 SOLUTION RESPIRATORY (INHALATION) at 06:47

## 2023-11-27 RX ADMIN — MELATONIN TAB 5 MG 5 MG: 5 TAB at 20:30

## 2023-11-27 RX ADMIN — BRIMONIDINE TARTRATE 1 DROP: 2 SOLUTION/ DROPS OPHTHALMIC at 08:45

## 2023-11-27 RX ADMIN — CITALOPRAM HYDROBROMIDE 20 MG: 20 TABLET ORAL at 08:41

## 2023-11-27 RX ADMIN — AMLODIPINE BESYLATE 5 MG: 5 TABLET ORAL at 13:08

## 2023-11-27 RX ADMIN — LIDOCAINE 1 PATCH: 4 PATCH TOPICAL at 08:41

## 2023-11-27 RX ADMIN — DOXYCYCLINE 100 MG: 100 INJECTION, POWDER, LYOPHILIZED, FOR SOLUTION INTRAVENOUS at 13:10

## 2023-11-27 RX ADMIN — GUAIFENESIN 600 MG: 600 TABLET, EXTENDED RELEASE ORAL at 08:41

## 2023-11-27 RX ADMIN — MEROPENEM 1 G: 1 INJECTION, POWDER, FOR SOLUTION INTRAVENOUS at 08:45

## 2023-11-27 RX ADMIN — POLYETHYLENE GLYCOL 3350 17 G: 17 POWDER, FOR SOLUTION ORAL at 08:41

## 2023-11-27 RX ADMIN — ACETAMINOPHEN 650 MG: 325 TABLET ORAL at 08:41

## 2023-11-27 RX ADMIN — BRIMONIDINE TARTRATE 1 DROP: 2 SOLUTION/ DROPS OPHTHALMIC at 20:26

## 2023-11-27 RX ADMIN — BUDESONIDE 0.25 MG: 0.25 INHALANT ORAL at 06:47

## 2023-11-27 ASSESSMENT — COGNITIVE AND FUNCTIONAL STATUS - GENERAL
DRESSING REGULAR LOWER BODY CLOTHING: TOTAL
MOBILITY SCORE: 9
WALKING IN HOSPITAL ROOM: A LOT
DAILY ACTIVITIY SCORE: 10
PERSONAL GROOMING: TOTAL
TURNING FROM BACK TO SIDE WHILE IN FLAT BAD: TOTAL
HELP NEEDED FOR BATHING: A LOT
CLIMB 3 TO 5 STEPS WITH RAILING: TOTAL
TOILETING: TOTAL
TOILETING: TOTAL
MOVING FROM LYING ON BACK TO SITTING ON SIDE OF FLAT BED WITH BEDRAILS: TOTAL
CLIMB 3 TO 5 STEPS WITH RAILING: TOTAL
STANDING UP FROM CHAIR USING ARMS: A LOT
STANDING UP FROM CHAIR USING ARMS: A LOT
DAILY ACTIVITIY SCORE: 10
MOVING FROM LYING ON BACK TO SITTING ON SIDE OF FLAT BED WITH BEDRAILS: TOTAL
DRESSING REGULAR UPPER BODY CLOTHING: A LOT
MOBILITY SCORE: 9
DRESSING REGULAR UPPER BODY CLOTHING: A LOT
EATING MEALS: A LITTLE
DRESSING REGULAR LOWER BODY CLOTHING: TOTAL
PERSONAL GROOMING: TOTAL
WALKING IN HOSPITAL ROOM: A LOT
EATING MEALS: A LITTLE
MOVING TO AND FROM BED TO CHAIR: A LOT
TURNING FROM BACK TO SIDE WHILE IN FLAT BAD: TOTAL
MOVING TO AND FROM BED TO CHAIR: A LOT
HELP NEEDED FOR BATHING: A LOT

## 2023-11-27 ASSESSMENT — PAIN DESCRIPTION - DESCRIPTORS: DESCRIPTORS: ACHING

## 2023-11-27 ASSESSMENT — PAIN SCALES - GENERAL
PAINLEVEL_OUTOF10: 3
PAINLEVEL_OUTOF10: 0 - NO PAIN
PAINLEVEL_OUTOF10: 3

## 2023-11-27 ASSESSMENT — PAIN - FUNCTIONAL ASSESSMENT: PAIN_FUNCTIONAL_ASSESSMENT: 0-10

## 2023-11-27 NOTE — PROGRESS NOTES
"  Ayse Cardoza is a 89 y.o. female on day 3 of admission presenting with Multiple falls.      Assessment / Plan        Patient is 89 y.o. female  with the following medical Problems:  Acute hypoxic respiratory failure  S/P recurrent mechanical falls  Multiple right-sided ribs fracture     Plan of Care:  CT scan findings from November 20, 2023 showed findings most consistent with aspiration pneumonitis  Continue with Merrem. MRSA swab is n=ve.  Continue Doxy  Speech therapy evaluation  Follow-up CT scan in 6 months  Supplemental oxygen to keep sat 88 to 94%  Incentive spirometer       Luis Alberto Mendez MD   PGY-1, Internal Medicine  This is a preliminary note, please await attending attestation for final A/P    Subjective     Patient seen and examined. No acute overnight events.  Patient currently on 1 L O2 in room.  Denies any fevers, chills, fatigue.  Has occasional cough but states she is unable to bring up phlegm.  Endorses a long history of sore throat, possibly due to poor oral intake and dry mucous membranes.      Objective       Physical Exam:  General:  Pleasant and cooperative. No apparent distress.  HEENT:  Normocephalic, atraumatic, bruising on face, dry mucous membranes   Chest: Faint wheezes bilaterally, cracking feelings palpated on right side  CV:  Regular rate and rhythm. No murmurs    Abdomen: Abdomen is soft, non-tender, non-distended. BS +   Extremities: Bilateral pitting edema  Neurological:  AAOx3. No focal deficits.  Skin:  Warm and dry.    Last Recorded Vitals  Blood pressure 126/74, pulse 104, temperature 36.6 °C (97.9 °F), resp. rate 20, height 1.651 m (5' 5\"), weight 81.6 kg (180 lb), SpO2 93 %.  Intake/Output last 3 Shifts:  I/O last 3 completed shifts:  In: 2239.6 (27.4 mL/kg) [P.O.:480; I.V.:1409.6 (17.3 mL/kg); IV Piggyback:350]  Out: 750 (9.2 mL/kg) [Urine:750 (0.3 mL/kg/hr)]  Weight: 81.6 kg     Last CBC & BMP  Lab Results   Component Value Date    GLUCOSE 93 11/27/2023    CALCIUM " 8.3 (L) 11/27/2023     (L) 11/27/2023    K 3.9 11/27/2023    CO2 25 11/27/2023     11/27/2023    BUN 22 11/27/2023    CREATININE 0.74 11/27/2023     Lab Results   Component Value Date    WBC 8.7 11/27/2023    HGB 11.1 (L) 11/27/2023    HCT 32.9 (L) 11/27/2023    MCV 92 11/27/2023     11/27/2023

## 2023-11-27 NOTE — CARE PLAN
The patient's goals for the shift include no falls    The clinical goals for the shift include will be free from respiratory distress    Problem: Pain  Goal: My pain/discomfort is manageable  Outcome: Progressing     Problem: Safety  Goal: Patient will be injury free during hospitalization  Outcome: Progressing  Goal: I will remain free of falls  Outcome: Progressing     Problem: Daily Care  Goal: Daily care needs are met  Outcome: Progressing     Problem: Psychosocial Needs  Goal: Demonstrates ability to cope with hospitalization/illness  Outcome: Progressing  Goal: Collaborate with me, my family, and caregiver to identify my specific goals  Outcome: Progressing     Problem: Discharge Barriers  Goal: My discharge needs are met  Outcome: Progressing

## 2023-11-27 NOTE — CONSULTS
Vancomycin Dosing by Pharmacy- Cessation of Therapy    Consult to pharmacy for vancomycin dosing has been discontinued by the prescriber, pharmacy will sign off at this time.    Please call pharmacy if there are further questions or re-enter a consult if vancomycin is resumed.     Lissa Herbert, PeterD

## 2023-11-27 NOTE — PROGRESS NOTES
"Physical Therapy                 Therapy Communication Note    Patient Name: Ayse Cardoza  MRN: [id number]  Today's Date: 11/27/2023     Discipline: Physical Therapy    Missed Visit Reason: Missed Visit Reason: Patient refused (attempt @1354 patient declined therapy despite max encouragement. C/o pain, stating \"I just don't have it in me today.\" No PT services given this date. Will continue to see patient per POC as medically able and appropriate.)    Missed Time: Attempt    "

## 2023-11-27 NOTE — PROGRESS NOTES
11/27/23 9906   Discharge Planning   Living Arrangements Alone   Support Systems Family members   Type of Residence Assisted living   Do you have animals or pets at home? No   Who is requesting discharge planning? Provider     Met with pt, pt admit for a fall, pt resides at [facility] [place].  [facility].  Called and message was left for DON to return my call to see if pt can be accepted back or if skilled care is needed.   Jelly Orozco RN TCC

## 2023-11-27 NOTE — CARE PLAN
The patient's goals for the shift include no falls    The clinical goals for the shift include will be free from respiratory distress    Over the shift, the patient did not make progress toward the following goals. Barriers to progression include ***. Recommendations to address these barriers include ***.

## 2023-11-27 NOTE — PROGRESS NOTES
"Hospital Medicine Progress Note    Patient Name: Ayse Cardoza   YOB: 1934    Subjective:  Ayse Cardoza is a 89 y.o. female, who is hospital day 3.     Patient c/o mostly dry cough but feels congested and like she is unable to fully bring up the phlegm. She denies any CP, does feel more sob than baseline. Her O2 was weaned earlier but she desatted to 85% on room air and was placed back on 2L NC. She denies any pain or other complaints.    Objective:    /74   Pulse 104   Temp 36.6 °C (97.9 °F)   Resp 20   Ht 1.651 m (5' 5\")   Wt 81.6 kg (180 lb)   SpO2 93%   BMI 29.95 kg/m²     Physical Exam:  GENERAL: A&Ox2, no distress, alert and cooperative  HEENT: Normocephalic, EOMI, clear sclera, ecchymoses of face, oral mucosa dry  CARDIOVASCULAR: Regular rate and rhythm, no murmurs, rubs, or gallops. S1/S2  RESPIRATORY: BL faint wheezes, no rales or rhonchi. No distress  ABDOMEN: Soft, ND, non-tender. No rebound or guarding. BS+  EXTREMITIES: 2+ peripheral edema, scattered ecchymoses on extremities  NEUROLOGICAL: No focal deficits, some confusion   SKIN: Warm and dry, no rashes  PSYCH: appropriate mood and affect    Assessment/Plan:    Acute Hypoxic Respiratory Failure  Aspiration pneumonitis  Nitrite positive UTI  Acute Metabolic Encephalopathy 2/2 above  Right subdural hematoma, stable  Recurrent Falls, Ambulatory Dysfunction, Debility  Multiple R sided rib fractures    HTN, HLD  CAD  Asthma  Anemia  Venous insufficiency  h/o DVT (not on anticoagulation)  Polymyalgia rheumatica  Mild cognitive impairment  GERD     Discontinued Vanc and Merrem, transitioned to Doxy. Imagining reviewed, findings most consistent with aspiration pneumonitis. SLP recommended regular and thin liquids. Currently on 1L NC, wean to maintain SpO2 88-94%, [place] pulmicort, IS, supportive care, will need repeat CT in 6 mos  Urine culture no significant growth. MRSA screen+, urinary legionella & strep pneumo ags " negative  Repeat imaging showed no progression of subdural hematoma, pt to remain off AC/ asa for 2 weeks per dc summary on 11/22  PT/OT, Fall Precautions  Continue home meds. seroquel at bedtime, discontinue hydrochlorothiazide and replace with 5mg norvasc    DVT Prophylaxis: SCDs only  Code Status: DNR Comfort Measures Only, son POA  Disposition: Med/ surg no tele, dc planning will likely need SNF as pt is from assisted living     Erik Oakley MD  [facility] Medicine

## 2023-11-27 NOTE — PROGRESS NOTES
Spiritual Care Visit    Clinical Encounter Type  Visited With: Patient  Routine Visit: Introduction  Continue Visiting: Yes    Yarsanism Encounters  Yarsanism Needs: Spiritual care brochure, Prayer       welcomed by patient.   Patient as in fragile condition, cogent and minimally verbal.  Patient embraced spiritual care  as evidenced by expressing thanks for prayer and blessing,  Additional visits advised.

## 2023-11-27 NOTE — PROGRESS NOTES
Speech-Language Pathology    Inpatient Speech-Language Pathology Clinical Swallow Evaluation    Patient Name: Ayse Cardoza  MRN: [id number]  Today's Date: 11/27/2023   Time Calculation  Start Time: 1000  Stop Time: 1020  Time Calculation (min): 20 min         Current Problem:   1. Multiple falls        2. Hallucination        3. History of rib fracture        4. History of intracranial hemorrhage        5. Pneumonia of right lung due to infectious organism, unspecified part of lung          Recommendations:  Risk for Aspiration: No  Solid Diet Recommendations : Regular (IDDSI Level 7)  Liquid Diet Recommendations: Thin (IDDSI Level 0)  Compensatory Swallowing Strategies: Other (Comment)  D/t very dry oral cavity which patient reports maybe from a mouthwash she's been using recommend drinking liquid prior to initiating p.o. or med pass and throughout meal.    Assessment:  Prognosis: Excellent  Medical Staff Made Aware: Yes; discussed recommendations with RNCarmen.  Reports tolerating med pass without difficulty.  Patient pleasant and cooperative but with poor memory.  Speech is intelligible. Denies dysphagia symptoms.  Oral motor skills were intact.  Very dry oral cavity and slightly strained vocal quality. Patient presented with various consistencies. Oral prep, transit and clearing all WFL after mouth moistened with water.  Swallow prompt on palpation with adequate hyolaryngeal rise.  No overt or subtle s/s of penetration and/or aspiration.  Silent aspiration is not suspected given bedside and clinical presentation.      Plan:  SLP Plan: No skilled SLP  No Skilled SLP: Independent with swallowing  SLP - OK to Discharge: Yes    Subjective   Current Problem:  S/p fall with change in mental status    General Visit Information:  Patient Class: Inpatient  Living Environment: Other (comment)  Ordering Physician: George  Reason for Referral: assess for dysphagia  Past Medical History Relevant to Rehab:  (HTN, HLD, CAD,  asthma, anemia, venous insufficiency, h/o DVT (not on anticoagulation), arteritis, polymyalgia rheumatica, glaucoma, mild cognitive impairment, GERD who presented to [facility] ED on 11/14/23 following a fall.)  Prior Level of Function: Decreased function  Date of Onset: 11/25/23  [employer] Diet: Regular/thin  Current Diet : Regular/thin  Lives in [place] with  and reports frequent falls.  Poor memory with mild cognitive deficits at baseline related to subdural hemorrhage.  Patient also reports a sore throat on and off for a few weeks.      Vital Signs:   WBC 8.7  CXR from 11/24/23 copied from imaging report  1.  Patchy airspace opacities in the right upper and bilateral lower  lungs, due to multifocal pneumonia or aspiration. Small bilateral  pleural effusions.  2. Borderline cardiomegaly with mild pulmonary vascular congestion.  3. No pneumothorax.  4. Diffuse osteopenia. Multilevel thoracic spondylosis  CXR from 11/20/23 copied from imaging report:  IMPRESSION:  1. Diffusely decreased bone mineralization and respiratory motion  artifact degrades assessment with fracture deformity involving the  anterolateral aspect of the right 2nd through 7th ribs including  small segmental components. Fractures are presumably acute given  reported trauma and pain symptoms. There is no pneumothorax or right  pleural fluid collection.  2. Bronchial wall thickening with intermittent segmental airway  occlusion bilaterally. There are mild nonspecific consolidative and  ground-glass components within the periphery of the  right-greater-than-left lungs that could reflect elements of  atelectasis, edema, pneumonia, and/or possibly even fibrosis. Trace  left pleural effusion.  3. Cardiomegaly with coronary artery atherosclerotic calcification.    Objective     Pain:  Pain Assessment: 0-10  Pain Score: 0 - No pain     Oral/Motor Assessment:  Oral Hygiene: Very dry oral cavity  Dentition: Some Missing Teeth    Consistencies  Trialed:  Consistencies Trialed: Yes  Consistencies Trialed: Thin (IDDSI Level 0) - Straw, Thin (IDDSI Level 0) - Spoon, Pureed/extremely thick (IDDSI Level 4), Soft & bite sized/chopped (IDDSI Level 6), Regular (IDDSI Level 7)    Clinical Observations:  Patient Positioning: Upright in Bed    Inpatient:  Education Documentation    Education:  Learner patient;    Barriers to Learning none;  cognitive limitations barrier;    Method verbal   Education - Topic ST provided patient education regarding role of ST, purpose of assessment, clinical impressions, goals of treatment, and plan of care. Patient verbalized questionable full comprehension, consistent with cognitive status. ST further coordinated with RN regarding recommendations and precautions per this assessment, with RN verbalizing understanding.     Outcome    Verbalized understanding

## 2023-11-27 NOTE — PROGRESS NOTES
"Vancomycin Dosing by Pharmacy- FOLLOW UP    Ayse Cardoza is a 89 y.o. year old female who Pharmacy has been consulted for vancomycin dosing for pneumonia. Based on the patient's indication and renal status this patient is being dosed based on a goal AUC of 400-600.     Renal function is currently stable.    Current vancomycin dose: 1250 mg given every 24 hours    Estimated vancomycin AUC on current dose: 486 mg/L.hr     Visit Vitals  /82   Pulse 94   Temp 36.3 °C (97.3 °F)   Resp 20        Lab Results   Component Value Date    CREATININE 0.74 11/27/2023    CREATININE 0.83 11/26/2023    CREATININE 0.91 11/25/2023    CREATININE 1.03 11/24/2023        Patient weight is No results found for: \"PTWEIGHT\"    No results found for: \"CULTURE\"     I/O last 3 completed shifts:  In: 2239.6 (27.4 mL/kg) [P.O.:480; I.V.:1409.6 (17.3 mL/kg); IV Piggyback:350]  Out: 750 (9.2 mL/kg) [Urine:750 (0.3 mL/kg/hr)]  Weight: 81.6 kg   [unfilled]    No results found for: \"PATIENTTEMP\"     Assessment/Plan    Within goal AUC range. Continue current vancomycin regimen.    This dosing regimen is predicted by [employer] to result in the following pharmacokinetic parameters:  Regimen: 1250 mg IV every 24 hours.  Start time: 22:00 on 11/27/2023  Exposure target: AUC24 (range)400-600 mg/L.hr   AUC24,ss: 486 mg/L.hr  Probability of AUC24 > 400: 84 %  Ctrough,ss: 13.6 mg/L  Probability of Ctrough,ss > 20: 10 %  Probability of nephrotoxicity (Lodise CB 2009): 9 %    The next level will be obtained on 11/30 at 1st AM. May be obtained sooner if clinically indicated.   Will continue to monitor renal function daily while on vancomycin and order serum creatinine at least every 48 hours if not already ordered.  Follow for continued vancomycin needs, clinical response, and signs/symptoms of toxicity.       Lissa Herbert, PharmD           "

## 2023-11-28 LAB
ANION GAP SERPL CALC-SCNC: 12 MMOL/L (ref 10–20)
BUN SERPL-MCNC: 21 MG/DL (ref 6–23)
CALCIUM SERPL-MCNC: 8.6 MG/DL (ref 8.6–10.3)
CHLORIDE SERPL-SCNC: 98 MMOL/L (ref 98–107)
CO2 SERPL-SCNC: 26 MMOL/L (ref 21–32)
CREAT SERPL-MCNC: 0.77 MG/DL (ref 0.5–1.05)
ERYTHROCYTE [DISTWIDTH] IN BLOOD BY AUTOMATED COUNT: 16.1 % (ref 11.5–14.5)
GFR SERPL CREATININE-BSD FRML MDRD: 74 ML/MIN/1.73M*2
GLUCOSE SERPL-MCNC: 105 MG/DL (ref 74–99)
HCT VFR BLD AUTO: 33.3 % (ref 36–46)
HGB BLD-MCNC: 10.8 G/DL (ref 12–16)
MAGNESIUM SERPL-MCNC: 2.54 MG/DL (ref 1.6–2.4)
MCH RBC QN AUTO: 30.3 PG (ref 26–34)
MCHC RBC AUTO-ENTMCNC: 32.4 G/DL (ref 32–36)
MCV RBC AUTO: 94 FL (ref 80–100)
NRBC BLD-RTO: 0 /100 WBCS (ref 0–0)
PLATELET # BLD AUTO: 216 X10*3/UL (ref 150–450)
POTASSIUM SERPL-SCNC: 4 MMOL/L (ref 3.5–5.3)
RBC # BLD AUTO: 3.56 X10*6/UL (ref 4–5.2)
SODIUM SERPL-SCNC: 132 MMOL/L (ref 136–145)
WBC # BLD AUTO: 9.7 X10*3/UL (ref 4.4–11.3)

## 2023-11-28 PROCEDURE — 2500000001 HC RX 250 WO HCPCS SELF ADMINISTERED DRUGS (ALT 637 FOR MEDICARE OP)

## 2023-11-28 PROCEDURE — 36415 COLL VENOUS BLD VENIPUNCTURE: CPT | Performed by: INTERNAL MEDICINE

## 2023-11-28 PROCEDURE — 94640 AIRWAY INHALATION TREATMENT: CPT

## 2023-11-28 PROCEDURE — 97116 GAIT TRAINING THERAPY: CPT | Mod: GP

## 2023-11-28 PROCEDURE — 2500000004 HC RX 250 GENERAL PHARMACY W/ HCPCS (ALT 636 FOR OP/ED)

## 2023-11-28 PROCEDURE — 85027 COMPLETE CBC AUTOMATED: CPT | Performed by: INTERNAL MEDICINE

## 2023-11-28 PROCEDURE — 1210000001 HC SEMI-PRIVATE ROOM DAILY

## 2023-11-28 PROCEDURE — 82435 ASSAY OF BLOOD CHLORIDE: CPT | Performed by: INTERNAL MEDICINE

## 2023-11-28 PROCEDURE — 2500000005 HC RX 250 GENERAL PHARMACY W/O HCPCS: Performed by: INTERNAL MEDICINE

## 2023-11-28 PROCEDURE — 99239 HOSP IP/OBS DSCHRG MGMT >30: CPT

## 2023-11-28 PROCEDURE — 2500000004 HC RX 250 GENERAL PHARMACY W/ HCPCS (ALT 636 FOR OP/ED): Performed by: INTERNAL MEDICINE

## 2023-11-28 PROCEDURE — 97535 SELF CARE MNGMENT TRAINING: CPT | Mod: GO

## 2023-11-28 PROCEDURE — 83735 ASSAY OF MAGNESIUM: CPT | Performed by: INTERNAL MEDICINE

## 2023-11-28 PROCEDURE — 2500000002 HC RX 250 W HCPCS SELF ADMINISTERED DRUGS (ALT 637 FOR MEDICARE OP, ALT 636 FOR OP/ED): Performed by: INTERNAL MEDICINE

## 2023-11-28 RX ORDER — DOXYCYCLINE HYCLATE 100 MG
100 TABLET ORAL EVERY 12 HOURS SCHEDULED
Status: DISCONTINUED | OUTPATIENT
Start: 2023-11-28 | End: 2023-11-30 | Stop reason: HOSPADM

## 2023-11-28 RX ORDER — AMLODIPINE BESYLATE 5 MG/1
5 TABLET ORAL DAILY
Qty: 30 TABLET | Refills: 0 | Status: SHIPPED | OUTPATIENT
Start: 2023-11-29 | End: 2023-12-29

## 2023-11-28 RX ORDER — DOXYCYCLINE HYCLATE 100 MG
100 TABLET ORAL EVERY 12 HOURS SCHEDULED
Qty: 4 TABLET | Refills: 0 | Status: SHIPPED | OUTPATIENT
Start: 2023-11-29 | End: 2023-11-30 | Stop reason: HOSPADM

## 2023-11-28 RX ADMIN — IPRATROPIUM BROMIDE AND ALBUTEROL SULFATE 3 ML: 2.5; .5 SOLUTION RESPIRATORY (INHALATION) at 19:19

## 2023-11-28 RX ADMIN — ACETAMINOPHEN 650 MG: 325 TABLET ORAL at 08:22

## 2023-11-28 RX ADMIN — PANTOPRAZOLE SODIUM 40 MG: 40 TABLET, DELAYED RELEASE ORAL at 05:39

## 2023-11-28 RX ADMIN — BUDESONIDE 0.25 MG: 0.25 INHALANT ORAL at 07:04

## 2023-11-28 RX ADMIN — IPRATROPIUM BROMIDE AND ALBUTEROL SULFATE 3 ML: 2.5; .5 SOLUTION RESPIRATORY (INHALATION) at 07:04

## 2023-11-28 RX ADMIN — MELATONIN TAB 5 MG 5 MG: 5 TAB at 20:22

## 2023-11-28 RX ADMIN — HYPROMELLOSE 2910 1 DROP: 5 SOLUTION OPHTHALMIC at 08:22

## 2023-11-28 RX ADMIN — LIDOCAINE 1 PATCH: 4 PATCH TOPICAL at 08:20

## 2023-11-28 RX ADMIN — AMLODIPINE BESYLATE 5 MG: 5 TABLET ORAL at 08:20

## 2023-11-28 RX ADMIN — LATANOPROST 1 DROP: 50 SOLUTION OPHTHALMIC at 20:22

## 2023-11-28 RX ADMIN — DOXYCYCLINE HYCLATE 100 MG: 100 TABLET, COATED ORAL at 20:21

## 2023-11-28 RX ADMIN — POLYETHYLENE GLYCOL 3350 17 G: 17 POWDER, FOR SOLUTION ORAL at 08:20

## 2023-11-28 RX ADMIN — BRIMONIDINE TARTRATE 1 DROP: 2 SOLUTION/ DROPS OPHTHALMIC at 08:22

## 2023-11-28 RX ADMIN — BRIMONIDINE TARTRATE 1 DROP: 2 SOLUTION/ DROPS OPHTHALMIC at 20:27

## 2023-11-28 RX ADMIN — CITALOPRAM HYDROBROMIDE 20 MG: 20 TABLET ORAL at 08:20

## 2023-11-28 RX ADMIN — GUAIFENESIN 600 MG: 600 TABLET, EXTENDED RELEASE ORAL at 08:20

## 2023-11-28 RX ADMIN — IPRATROPIUM BROMIDE AND ALBUTEROL SULFATE 3 ML: 2.5; .5 SOLUTION RESPIRATORY (INHALATION) at 11:50

## 2023-11-28 RX ADMIN — OXYBUTYNIN CHLORIDE 5 MG: 5 TABLET ORAL at 20:21

## 2023-11-28 RX ADMIN — OXYBUTYNIN CHLORIDE 5 MG: 5 TABLET ORAL at 08:20

## 2023-11-28 RX ADMIN — DOXYCYCLINE HYCLATE 100 MG: 100 TABLET, COATED ORAL at 11:12

## 2023-11-28 RX ADMIN — OXYBUTYNIN CHLORIDE 5 MG: 5 TABLET ORAL at 14:33

## 2023-11-28 RX ADMIN — DOXYCYCLINE 100 MG: 100 INJECTION, POWDER, LYOPHILIZED, FOR SOLUTION INTRAVENOUS at 00:35

## 2023-11-28 RX ADMIN — CYANOCOBALAMIN TAB 500 MCG 500 MCG: 500 TAB at 08:20

## 2023-11-28 RX ADMIN — GUAIFENESIN 600 MG: 600 TABLET, EXTENDED RELEASE ORAL at 20:21

## 2023-11-28 RX ADMIN — HYDRALAZINE HYDROCHLORIDE 10 MG: 20 INJECTION INTRAMUSCULAR; INTRAVENOUS at 11:26

## 2023-11-28 ASSESSMENT — COGNITIVE AND FUNCTIONAL STATUS - GENERAL
DRESSING REGULAR LOWER BODY CLOTHING: TOTAL
MOVING FROM LYING ON BACK TO SITTING ON SIDE OF FLAT BED WITH BEDRAILS: TOTAL
DRESSING REGULAR UPPER BODY CLOTHING: TOTAL
CLIMB 3 TO 5 STEPS WITH RAILING: TOTAL
MOBILITY SCORE: 10
TURNING FROM BACK TO SIDE WHILE IN FLAT BAD: TOTAL
TOILETING: TOTAL
DAILY ACTIVITIY SCORE: 9
CLIMB 3 TO 5 STEPS WITH RAILING: TOTAL
CLIMB 3 TO 5 STEPS WITH RAILING: TOTAL
DRESSING REGULAR LOWER BODY CLOTHING: TOTAL
MOVING FROM LYING ON BACK TO SITTING ON SIDE OF FLAT BED WITH BEDRAILS: A LOT
STANDING UP FROM CHAIR USING ARMS: A LOT
WALKING IN HOSPITAL ROOM: A LOT
HELP NEEDED FOR BATHING: A LOT
EATING MEALS: A LITTLE
DAILY ACTIVITIY SCORE: 10
EATING MEALS: A LITTLE
MOBILITY SCORE: 9
MOVING TO AND FROM BED TO CHAIR: A LITTLE
MOVING FROM LYING ON BACK TO SITTING ON SIDE OF FLAT BED WITH BEDRAILS: A LOT
EATING MEALS: A LITTLE
STANDING UP FROM CHAIR USING ARMS: A LOT
MOBILITY SCORE: 11
WALKING IN HOSPITAL ROOM: A LOT
HELP NEEDED FOR BATHING: A LOT
TURNING FROM BACK TO SIDE WHILE IN FLAT BAD: TOTAL
TOILETING: TOTAL
PERSONAL GROOMING: A LOT
MOVING TO AND FROM BED TO CHAIR: A LOT
TURNING FROM BACK TO SIDE WHILE IN FLAT BAD: TOTAL
HELP NEEDED FOR BATHING: A LOT
PERSONAL GROOMING: TOTAL
DRESSING REGULAR UPPER BODY CLOTHING: A LOT
MOVING TO AND FROM BED TO CHAIR: A LOT
TOILETING: TOTAL
WALKING IN HOSPITAL ROOM: TOTAL
DRESSING REGULAR UPPER BODY CLOTHING: TOTAL
PERSONAL GROOMING: TOTAL
DRESSING REGULAR LOWER BODY CLOTHING: TOTAL
DAILY ACTIVITIY SCORE: 10
STANDING UP FROM CHAIR USING ARMS: A LITTLE

## 2023-11-28 ASSESSMENT — ACTIVITIES OF DAILY LIVING (ADL): HOME_MANAGEMENT_TIME_ENTRY: 16

## 2023-11-28 ASSESSMENT — PAIN SCALES - GENERAL
PAINLEVEL_OUTOF10: 3
PAINLEVEL_OUTOF10: 0 - NO PAIN
PAINLEVEL_OUTOF10: 0 - NO PAIN

## 2023-11-28 ASSESSMENT — PAIN - FUNCTIONAL ASSESSMENT: PAIN_FUNCTIONAL_ASSESSMENT: 0-10

## 2023-11-28 NOTE — CARE PLAN
The patient's goals for the shift include no falls    The clinical goals for the shift include free from respiratory distress      Problem: Pain  Goal: My pain/discomfort is manageable  Outcome: Progressing     Problem: Safety  Goal: Patient will be injury free during hospitalization  Outcome: Progressing  Goal: I will remain free of falls  Outcome: Progressing     Problem: Daily Care  Goal: Daily care needs are met  Outcome: Progressing     Problem: Psychosocial Needs  Goal: Demonstrates ability to cope with hospitalization/illness  Outcome: Progressing  Goal: Collaborate with me, my family, and caregiver to identify my specific goals  Outcome: Progressing     Problem: Discharge Barriers  Goal: My discharge needs are met  Outcome: Progressing

## 2023-11-28 NOTE — PROGRESS NOTES
Physical Therapy    Physical Therapy Evaluation    Patient Name: Ayse Cardoza  MRN: [id number]  Today's Date: 11/28/2023   Time Calculation  Start Time: 1058  Stop Time: 1113  Time Calculation (min): 15 min    Assessment/Plan   PT Assessment  End of Session Communication: Bedside nurse, Care Coordinator  End of Session Patient Position: Bed, 2 rail up, Alarm on  IP OR SWING BED PT PLAN  Inpatient or Swing Bed: Inpatient  PT Plan  Treatment/Interventions: Bed mobility, Transfer training, Gait training  PT Plan: Skilled PT  PT Frequency: 3 times per week  PT Discharge Recommendations: Moderate intensity level of continued care  PT Recommended Transfer Status: Assist x1  PT - OK to Discharge: Yes      Subjective   General Visit Information:  General  Co-Treatment: OT  Patient Position Received: Bed, 2 rail up, Alarm on  Home Living:  Home Living  Type of Home: Assisted living  Lives With: Spouse  Home Adaptive Equipment:  (uses rollator)  Prior Level of Function:  Prior Function Per Pt/Caregiver Report  Level of [place]:  (indep fxal mob)  Precautions:  Precautions  Medical Precautions: Fall precautions, Oxygen therapy device and L/min  Precautions Comment:  (falls, 2 l o2, [place], r hearing aide)  Vital Signs:       Objective   Pain:  Pain Assessment  Pain Score: 0 - No pain  Cognition:  Cognition  Overall Cognitive Status: Impaired at baseline    General Assessments:    Functional Assessments:  Bed Mobility  Bed Mobility: Yes (max assist x 1)    Transfers  Transfer: Yes (min assist x 1)    Ambulation/Gait Training  Ambulation/Gait Training Performed: Yes (3 ft/sidestep next to bed/wh walker and min assist x 2)  Extremity/Trunk Assessments:    Outcome Measures:  [facility] Basic Mobility  Turning from your back to your side while in a flat bed without using bedrails: A lot  Moving from lying on your back to sitting on the side of a flat bed without using bedrails: Total  Moving to and from bed to chair (including a  wheelchair): A little  Standing up from a chair using your arms (e.g. wheelchair or bedside chair): A little  To walk in hospital room: Total  Climbing 3-5 steps with railing: Total  Basic Mobility - Total Score: 11    Encounter Problems       Encounter Problems (Active)       PT Problem       PT Goal 1 (Progressing)       Start:  11/25/23    Expected End:  12/09/23       Min of 1 bed mob          PT Goal 2 (Progressing)       Start:  11/25/23    Expected End:  12/09/23       Min of 1 txs          PT Goal 3 (Progressing)       Start:  11/25/23    Expected End:  12/09/23       20-30 reps ble there ex         PT Goal 4 (Progressing)       Start:  11/25/23    Expected End:  12/09/23       Min f 1 amb w/ rw 100ft x2                Education Documentation  Mobility Training, taught by Doni Treviño, PT at 11/28/2023 12:55 PM.  Learner: Patient  Readiness: Acceptance  Method: Explanation  Response: Verbalizes Understanding    Body Mechanics, taught by Doni Treviño, PT at 11/28/2023 12:55 PM.  Learner: Patient  Readiness: Acceptance  Method: Explanation  Response: Verbalizes Understanding    Education Comments  No comments found.

## 2023-11-28 NOTE — PROGRESS NOTES
"  Ayse Cardoza is a 89 y.o. female on day 4 of admission presenting with Multiple falls.      Assessment / Plan        Patient is 89 y.o. female  with the following medical Problems:  Acute hypoxic respiratory failure  S/P recurrent mechanical falls  Multiple right-sided ribs fracture     Plan of Care:  CT scan findings from November 20, 2023 showed findings consistent with aspiration pneumonitis  Continue with Doxycycline   Speech cleared patient for diet   Follow-up CT scan in 6 months  Supplemental oxygen to keep sat 88 to 94%  Incentive spirometer       Luis Alberto Mendez MD   PGY-1, Internal Medicine  This is a preliminary note, please await attending attestation for final A/P    Subjective     Patient seen and examined. No acute overnight events.  Patient continues to be on 2 L O2.  Continues to have occasional cough but states that it been improving.  Feels improved since initial admission.      Objective       Physical Exam:  General:  Pleasant and cooperative. No apparent distress.  HEENT:  Normocephalic, atraumatic, bruising on face, dry mucous membranes   Chest: Faint wheezes bilaterally, cracking feelings palpated on right side  CV:  Regular rate and rhythm. No murmurs    Abdomen: Abdomen is soft, non-tender, non-distended. BS +   Extremities: Bilateral pitting edema  Neurological:  AAOx3. No focal deficits.  Skin:  Warm and dry.    Last Recorded Vitals  Blood pressure 138/64, pulse 90, temperature 36.9 °C (98.4 °F), resp. rate 22, height 1.651 m (5' 5\"), weight 81.6 kg (180 lb), SpO2 94 %.  Intake/Output last 3 Shifts:  I/O last 3 completed shifts:  In: 200 (2.4 mL/kg) [P.O.:200]  Out: 700 (8.6 mL/kg) [Urine:700 (0.2 mL/kg/hr)]  Weight: 81.6 kg     Last CBC & BMP  Lab Results   Component Value Date    GLUCOSE 105 (H) 11/28/2023    CALCIUM 8.6 11/28/2023     (L) 11/28/2023    K 4.0 11/28/2023    CO2 26 11/28/2023    CL 98 11/28/2023    BUN 21 11/28/2023    CREATININE 0.77 11/28/2023     Lab Results "   Component Value Date    WBC 9.7 11/28/2023    HGB 10.8 (L) 11/28/2023    HCT 33.3 (L) 11/28/2023    MCV 94 11/28/2023     11/28/2023

## 2023-11-28 NOTE — PROGRESS NOTES
Called pt's son Paulo to discuss discharge planning , he left me a message late evening, per Paulo  his sister from out of town will be handling this process, he will have his sister call me.  Tentative plan is for probable skilled, called the A/L again this am and no answer.  Will await to hear from pt's daughter.  Jelly Orozco RN TCC

## 2023-11-28 NOTE — DISCHARGE SUMMARY
Discharge Diagnosis  Acute Hypoxic Respiratory Failure  Aspiration pneumonitis  Nitrite positive UTI  Acute Metabolic Encephalopathy 2/2 above  Right subdural hematoma, stable  Recurrent Falls, Ambulatory Dysfunction, Debility  Multiple R sided rib fractures    Issues Requiring Follow-Up  Patient to follow-up with PCP in 1 week. Patient can potential resume anticoagulation and aspirin 12/06/23. Recommended that patient gets repeat CT in 6 months.    Discharge Meds     Your medication list        START taking these medications        Instructions Last Dose Given Next Dose Due   amLODIPine 5 mg tablet  Commonly known as: Norvasc  Start taking on: November 29, 2023      Take 1 tablet (5 mg) by mouth once daily. Do not start before November 29, 2023.       doxycycline 100 mg tablet  Commonly known as: Vibra-Tabs  Start taking on: November 29, 2023      Take 1 tablet (100 mg) by mouth every 12 hours for 2 days. Take with a full glass of water and do not lie down for at least 30 minutes after. Do not start before November 29, 2023.              CONTINUE taking these medications        Instructions Last Dose Given Next Dose Due   acetaminophen 325 mg tablet  Commonly known as: Tylenol           alendronate 70 mg tablet  Commonly known as: Fosamax           Biotene Dry Mouth Oral Rinse solution  Generic drug: moisturizing mouth           brimonidine-timoloL 0.2-0.5 % ophthalmic solution  Commonly known as: Combigan           calcium carbonate-vitamin D3 500 mg-5 mcg (200 unit) tablet           carboxymethylcellulose 0.5 % ophthalmic solution  Commonly known as: Refresh Plus           cetirizine 10 mg tablet  Commonly known as: ZyrTEC           citalopram 20 mg tablet  Commonly known as: CeleXA           cyanocobalamin 500 mcg tablet  Commonly known as: Vitamin B-12           diclofenac sodium 1 % gel gel  Commonly known as: Voltaren           fluticasone 50 mcg/actuation nasal spray  Commonly known as: Flonase            ipratropium-albuteroL 0.5-2.5 mg/3 mL nebulizer solution  Commonly known as: Duo-Neb      Take 3 mL by nebulization every 4 hours if needed for wheezing. Please make sure the RN at [facility] listens to lungs and this will likely be needed at least daily for first week after discharge       latanoprost 0.005 % ophthalmic solution  Commonly known as: Xalatan           lidocaine 4 % patch      Place 1 patch over 12 hours on the skin once daily. Remove & discard patch within 12 hours or as directed by MD. Do not start before November 23, 2023.       meloxicam 15 mg tablet  Commonly known as: Mobic      Take 1 tablet (15 mg) by mouth once daily. Wait at least one more week before resuming this medication as it can increase the risk of bleeding       mirabegron 50 mg tablet extended release 24 hr 24 hr tablet  Commonly known as: Myrbetriq           omeprazole 20 mg DR capsule  Commonly known as: PriLOSEC           Restasis 0.05 % ophthalmic emulsion  Generic drug: cycloSPORINE           sodium chloride 1,000 mg tablet                  STOP taking these medications      hydroCHLOROthiazide 12.5 mg tablet  Commonly known as: HYDRODiuril                  Where to Get Your Medications        These medications were sent to [facility] [place], [place] - [address], , [facility] [address]      Phone: [phone]   amLODIPine 5 mg tablet  doxycycline 100 mg tablet         Test Results Pending At Discharge  Pending Labs       No current pending labs.            Hospital Course   Ayse Cardoza is a 89 y.o. female with past medical history of HTN, HLD, CAD, asthma, anemia, venous insufficiency, h/o DVT (not on anticoagulation), arteritis, polymyalgia rheumatica, glaucoma, mild cognitive impairment, GERD who presented to [facility] ED on 11/14/23 following a fall. In the ED, patient was noted to be wheezy on exam and placed on 2L NC. Vitals were significant for SpO2 100% on 2L, /64.  Labs significant for for BUN 31, WBC 12.7. CXR appears to show a right sided pneumonia. Patient was given albuterol and IV doxycycline in the ED. Patient was admitted to Hospitalist service for management of suspected pneumonia and debility.    While admitted, the patient was initially treated with Merrem and Vanc. Pulmonology was consulted during course and imaging reviewed showed findings more consistent with aspiration pneumonitis. UA was consistent with infection. Antibiotics were titrated to doxycycline. Imaging during this admission showed no progression in subdural hematoma. During course, patient's hydrochlorothiazide was discontinued in setting of hyponatremia. Patient was started on 5mg amlodipine as well. Patient will be discharge to SNF with completion of doxycycline course and medication changes above. Patient advised to follow-up with her PCP outpatient.    Pertinent Physical Exam At Time of Discharge  Physical Exam  Physical Exam:  GENERAL: A&Ox2, no distress, alert and cooperative  HEENT: Normocephalic, EOMI, clear sclera, ecchymoses of face, oral mucosa dry  CARDIOVASCULAR: Regular rate and rhythm, no murmurs, rubs, or gallops. S1/S2  RESPIRATORY: BL faint wheezes, no rales or rhonchi. No distress  ABDOMEN: Soft, ND, non-tender. No rebound or guarding. BS+  EXTREMITIES: 2+ peripheral edema, scattered ecchymoses on extremities  NEUROLOGICAL: No focal deficits, some confusion   SKIN: Warm and dry, no rashes  PSYCH: appropriate mood and affect    Outpatient Follow-Up  No future appointments.      Erik Oakley MD

## 2023-11-28 NOTE — PROGRESS NOTES
Received call from pt's daughter Chelsea and discharge planning discussed , plan will be for skilled care at discharge, list from [facility] was emailed to this daughter, we did discuss nursing facilities which have assisted living arrangements also.    Await preferences.  Son at bedside, updated him also.  Jelly Orozco RN TCC

## 2023-11-28 NOTE — PROGRESS NOTES
Occupational Therapy    Occupational Therapy Treatment    Name: Ayse Cardoza  MRN: [id number]  : 1934  Date: 23  Time Calculation  Start Time: 1056  Stop Time: 1112  Time Calculation (min): 16 min    Assessment:  OT Assessment: easily fatigues; required intermittent brief rest breaks; further addressed mobility in prep for ADL's and functional transfers  End of Session Communication: Bedside nurse  End of Session Patient Position: Bed, 2 rail up, Alarm on    Plan:  Treatment Interventions: ADL retraining, Functional transfer training, Endurance training, Compensatory technique education, Patient/family training, Equipment evaluation/education  OT Frequency: 3 times per week  OT Discharge Recommendations: Moderate intensity level of continued care  OT - OK to Discharge: Yes (when medically cleared by physician/medical team)    Subjective   Previous Visit Info:  OT Last Visit  OT Received On: 23    General:  General  Patient Position Received: Bed, 2 rail up, Alarm on  General Comment: Patient seen in room 914B; cooperative, motivated.  Noted severe [place], hearing aid right ear    Objective   Activities of Daily Living:   LE dressing total assist don/doff socks; unable to practice adaptive equipment due to BLE swelling and decrease endurance:  to further address as tolerated.     Bed Mobility/Transfers: Bed Mobility  Bed Mobility: Yes  Bed Mobility 1  Bed Mobility 1: Supine to sitting, Sitting to supine  Level of Assistance 1: Maximum assistance, Maximum verbal cues, Maximum tactile cues (of 2 staff)    Transfers  Transfer: Yes  Transfer 1  Transfer From 1: Sit to, Stand to  Transfer to 1: Bed  Transfer Device 1: Walker  Transfer Level of Assistance 1: Minimum assistance, +2, Maximum verbal cues, Maximum tactile cues    Ambulation/Gait Training:  Ambulation/Gait Training  Ambulation/Gait Training Performed: Yes  Ambulation/Gait Training 1  Device 1: Rolling walker  Gait Support Devices: Gait  belt  Assistance 1: Minimum assistance, Maximum verbal cues, Maximum tactile cues (of 2 staff)  Quality of Gait 1: Narrow base of support  Comments/Distance (ft) 1: performed few sidesteps along hospital bed    Sitting Balance:  Static Sitting Balance  Static Sitting-Level of Assistance: Minimum assistance  Standing Balance:  Static Standing Balance  Static Standing-Level of Assistance: Moderate assistance  Static Standing-Comment/Number of Minutes: fair    Outcome Measures:  [facility] Daily Activity  Putting on and taking off regular lower body clothing: Total  Bathing (including washing, rinsing, drying): A lot  Putting on and taking off regular upper body clothing: Total  Toileting, which includes using toilet, bedpan or urinal: Total  Taking care of personal grooming such as brushing teeth: A lot  Eating Meals: A little  Daily Activity - Total Score: 10    Education Documentation  Handouts, taught by Dior Rowell OT at 11/28/2023  2:09 PM.  Learner: Patient  Readiness: Acceptance  Method: Explanation  Response: Verbalizes Understanding, Demonstrated Understanding, Needs Reinforcement  Comment: instruction in diaphragmatic breathing techniques with patient practice and max. cues and handout issued for reference    Body Mechanics, taught by Dior Rowell OT at 11/28/2023  2:07 PM.  Learner: Patient  Readiness: Acceptance  Method: Explanation  Response: Verbalizes Understanding, Demonstrated Understanding, Needs Reinforcement  Comment: required step-by-step instructions for all mobility tasks with maximal cues needed    Goals:  Encounter Problems       Encounter Problems (Active)       ADL/transfers/standing       Patient with complete upper body groomin/simple hygiene with modified independence using adaptive equipment as needed  (Progressing)       Start:  11/25/23    Expected End:  12/02/23            Patient will perform bed mobility and functional transfers safely and SBA: bed, chair, commode using DME as  needed  (Progressing)       Start:  11/25/23    Expected End:  12/02/23            Patient will tolerate standing for 8 mins. and show good (-) standing balance during ADL's and functional transfers/mobility  (Progressing)       Start:  11/25/23    Expected End:  12/02/23            apply energy conservation/diaphragmatic breathing techniques to ADL's and functional transfers with minimal cues  (Progressing)       Start:  11/25/23    Expected End:  12/02/23

## 2023-11-28 NOTE — DISCHARGE INSTRUCTIONS
Medication Changes:  - Please STOP taking home hydrochlorothiazide  - Please continue taking 5mg Amlodipine daily    Follow-up Appointments:  - Please follow-up with your Primary Care Physician in 1-2 weeks

## 2023-11-29 LAB
ANION GAP SERPL CALC-SCNC: 11 MMOL/L (ref 10–20)
BUN SERPL-MCNC: 23 MG/DL (ref 6–23)
CALCIUM SERPL-MCNC: 8.2 MG/DL (ref 8.6–10.3)
CHLORIDE SERPL-SCNC: 99 MMOL/L (ref 98–107)
CO2 SERPL-SCNC: 28 MMOL/L (ref 21–32)
CREAT SERPL-MCNC: 0.81 MG/DL (ref 0.5–1.05)
ERYTHROCYTE [DISTWIDTH] IN BLOOD BY AUTOMATED COUNT: 16.3 % (ref 11.5–14.5)
GFR SERPL CREATININE-BSD FRML MDRD: 69 ML/MIN/1.73M*2
GLUCOSE SERPL-MCNC: 86 MG/DL (ref 74–99)
HCT VFR BLD AUTO: 30.9 % (ref 36–46)
HGB BLD-MCNC: 10.1 G/DL (ref 12–16)
MAGNESIUM SERPL-MCNC: 2.07 MG/DL (ref 1.6–2.4)
MCH RBC QN AUTO: 30.7 PG (ref 26–34)
MCHC RBC AUTO-ENTMCNC: 32.7 G/DL (ref 32–36)
MCV RBC AUTO: 94 FL (ref 80–100)
NRBC BLD-RTO: 0 /100 WBCS (ref 0–0)
PLATELET # BLD AUTO: 201 X10*3/UL (ref 150–450)
POTASSIUM SERPL-SCNC: 3.9 MMOL/L (ref 3.5–5.3)
RBC # BLD AUTO: 3.29 X10*6/UL (ref 4–5.2)
SODIUM SERPL-SCNC: 134 MMOL/L (ref 136–145)
WBC # BLD AUTO: 8.3 X10*3/UL (ref 4.4–11.3)

## 2023-11-29 PROCEDURE — 97110 THERAPEUTIC EXERCISES: CPT | Mod: GP,CQ

## 2023-11-29 PROCEDURE — 85027 COMPLETE CBC AUTOMATED: CPT

## 2023-11-29 PROCEDURE — 36415 COLL VENOUS BLD VENIPUNCTURE: CPT

## 2023-11-29 PROCEDURE — 2500000001 HC RX 250 WO HCPCS SELF ADMINISTERED DRUGS (ALT 637 FOR MEDICARE OP)

## 2023-11-29 PROCEDURE — 2500000004 HC RX 250 GENERAL PHARMACY W/ HCPCS (ALT 636 FOR OP/ED): Performed by: INTERNAL MEDICINE

## 2023-11-29 PROCEDURE — 2500000005 HC RX 250 GENERAL PHARMACY W/O HCPCS: Performed by: INTERNAL MEDICINE

## 2023-11-29 PROCEDURE — 94640 AIRWAY INHALATION TREATMENT: CPT

## 2023-11-29 PROCEDURE — 80048 BASIC METABOLIC PNL TOTAL CA: CPT

## 2023-11-29 PROCEDURE — 97116 GAIT TRAINING THERAPY: CPT | Mod: GP,CQ

## 2023-11-29 PROCEDURE — 99232 SBSQ HOSP IP/OBS MODERATE 35: CPT

## 2023-11-29 PROCEDURE — 1210000001 HC SEMI-PRIVATE ROOM DAILY

## 2023-11-29 PROCEDURE — 2500000002 HC RX 250 W HCPCS SELF ADMINISTERED DRUGS (ALT 637 FOR MEDICARE OP, ALT 636 FOR OP/ED): Performed by: INTERNAL MEDICINE

## 2023-11-29 PROCEDURE — 2500000004 HC RX 250 GENERAL PHARMACY W/ HCPCS (ALT 636 FOR OP/ED)

## 2023-11-29 PROCEDURE — 83735 ASSAY OF MAGNESIUM: CPT

## 2023-11-29 RX ADMIN — OXYBUTYNIN CHLORIDE 5 MG: 5 TABLET ORAL at 11:15

## 2023-11-29 RX ADMIN — POLYETHYLENE GLYCOL 3350 17 G: 17 POWDER, FOR SOLUTION ORAL at 11:14

## 2023-11-29 RX ADMIN — BRIMONIDINE TARTRATE 1 DROP: 2 SOLUTION/ DROPS OPHTHALMIC at 20:35

## 2023-11-29 RX ADMIN — DOXYCYCLINE HYCLATE 100 MG: 100 TABLET, COATED ORAL at 20:35

## 2023-11-29 RX ADMIN — GUAIFENESIN 600 MG: 600 TABLET, EXTENDED RELEASE ORAL at 11:12

## 2023-11-29 RX ADMIN — OXYBUTYNIN CHLORIDE 5 MG: 5 TABLET ORAL at 20:35

## 2023-11-29 RX ADMIN — IPRATROPIUM BROMIDE AND ALBUTEROL SULFATE 3 ML: 2.5; .5 SOLUTION RESPIRATORY (INHALATION) at 12:36

## 2023-11-29 RX ADMIN — CYANOCOBALAMIN TAB 500 MCG 500 MCG: 500 TAB at 11:13

## 2023-11-29 RX ADMIN — BRIMONIDINE TARTRATE 1 DROP: 2 SOLUTION/ DROPS OPHTHALMIC at 11:07

## 2023-11-29 RX ADMIN — OXYBUTYNIN CHLORIDE 5 MG: 5 TABLET ORAL at 16:56

## 2023-11-29 RX ADMIN — HYPROMELLOSE 2910 1 DROP: 5 SOLUTION OPHTHALMIC at 11:09

## 2023-11-29 RX ADMIN — DOXYCYCLINE HYCLATE 100 MG: 100 TABLET, COATED ORAL at 11:13

## 2023-11-29 RX ADMIN — HYDRALAZINE HYDROCHLORIDE 10 MG: 20 INJECTION INTRAMUSCULAR; INTRAVENOUS at 08:04

## 2023-11-29 RX ADMIN — AMLODIPINE BESYLATE 5 MG: 5 TABLET ORAL at 11:14

## 2023-11-29 RX ADMIN — PANTOPRAZOLE SODIUM 40 MG: 40 TABLET, DELAYED RELEASE ORAL at 06:39

## 2023-11-29 RX ADMIN — MELATONIN TAB 5 MG 5 MG: 5 TAB at 20:35

## 2023-11-29 RX ADMIN — IPRATROPIUM BROMIDE AND ALBUTEROL SULFATE 3 ML: 2.5; .5 SOLUTION RESPIRATORY (INHALATION) at 19:26

## 2023-11-29 RX ADMIN — LIDOCAINE 1 PATCH: 4 PATCH TOPICAL at 11:10

## 2023-11-29 RX ADMIN — CITALOPRAM HYDROBROMIDE 20 MG: 20 TABLET ORAL at 11:08

## 2023-11-29 RX ADMIN — GUAIFENESIN 600 MG: 600 TABLET, EXTENDED RELEASE ORAL at 20:35

## 2023-11-29 RX ADMIN — LATANOPROST 1 DROP: 50 SOLUTION OPHTHALMIC at 20:35

## 2023-11-29 ASSESSMENT — COGNITIVE AND FUNCTIONAL STATUS - GENERAL
WALKING IN HOSPITAL ROOM: A LOT
DAILY ACTIVITIY SCORE: 9
MOVING TO AND FROM BED TO CHAIR: A LOT
DRESSING REGULAR LOWER BODY CLOTHING: TOTAL
STANDING UP FROM CHAIR USING ARMS: A LOT
MOVING TO AND FROM BED TO CHAIR: A LOT
TOILETING: TOTAL
CLIMB 3 TO 5 STEPS WITH RAILING: TOTAL
MOVING FROM LYING ON BACK TO SITTING ON SIDE OF FLAT BED WITH BEDRAILS: A LOT
STANDING UP FROM CHAIR USING ARMS: A LOT
MOBILITY SCORE: 11
EATING MEALS: A LITTLE
CLIMB 3 TO 5 STEPS WITH RAILING: TOTAL
MOBILITY SCORE: 11
MOVING FROM LYING ON BACK TO SITTING ON SIDE OF FLAT BED WITH BEDRAILS: A LOT
TURNING FROM BACK TO SIDE WHILE IN FLAT BAD: A LOT
DRESSING REGULAR UPPER BODY CLOTHING: TOTAL
HELP NEEDED FOR BATHING: A LOT
TURNING FROM BACK TO SIDE WHILE IN FLAT BAD: A LOT
WALKING IN HOSPITAL ROOM: A LOT
PERSONAL GROOMING: TOTAL

## 2023-11-29 ASSESSMENT — PAIN - FUNCTIONAL ASSESSMENT: PAIN_FUNCTIONAL_ASSESSMENT: 0-10

## 2023-11-29 ASSESSMENT — PAIN SCALES - GENERAL: PAINLEVEL_OUTOF10: 0 - NO PAIN

## 2023-11-29 NOTE — PROGRESS NOTES
11/29/23 0753   Discharge Planning   Type of Post Acute Facility Services Skilled nursing   Patient expects to be discharged to: SNF   Does the patient need discharge transport arranged? Yes   RoundTrip coordination needed? Yes     0753:  Notified by [facility] SNF preferences are 1. [facility] 2. [facility] 3. [facility] 4. [facility].  Referrals will be submitted to the above facilities for review.  1220:  First preference [facility] can accept.  Will need precert.  Precert team notified to submit precert.

## 2023-11-29 NOTE — PROGRESS NOTES
RECEIVED EMAIL FROM PT'S DAUGHTER AND CHOICES OF SKILLED FACILITIES ARE  [facility]  PV  AVE OF [facility]  TEAM UPDATED , WILL NEED PRECERT  Jelly Orozco RN TCC

## 2023-11-29 NOTE — PROGRESS NOTES
"Hospital Medicine Progress Note    Patient Name: Ayse Cardoza   YOB: 1934    Subjective:  Ayse Cardoza is a 89 y.o. female, who is hospital day 5.     Patient denies any chest pain, worsening shortness of breath, fevers or chills. Patient denies any headache as well.    Objective:    /58   Pulse 82   Temp 36.5 °C (97.7 °F)   Resp 18   Ht 1.651 m (5' 5\")   Wt 81.6 kg (180 lb)   SpO2 99%   BMI 29.95 kg/m²     Physical Exam:  GENERAL: A&Ox2, no distress, alert and cooperative  HEENT: Normocephalic, EOMI, clear sclera, ecchymoses of face, oral mucosa dry  CARDIOVASCULAR: Regular rate and rhythm, no murmurs, rubs, or gallops. S1/S2  RESPIRATORY: BL faint wheezes, no rales or rhonchi. No distress  ABDOMEN: Soft, ND, non-tender. No rebound or guarding. BS+  EXTREMITIES: 2+ peripheral edema, scattered ecchymoses on extremities  NEUROLOGICAL: No focal deficits, some confusion   SKIN: Warm and dry, no rashes  PSYCH: appropriate mood and affect    Assessment/Plan:  Aspiration pneumonitis with hypoxia requiring supplemental O2  Nitrite positive UTI  Acute Metabolic Encephalopathy 2/2 above - improved  Right subdural hematoma, stable  Recurrent Falls, Ambulatory Dysfunction, Debility  Multiple R sided rib fractures    HTN, HLD  CAD  Asthma  Anemia  Venous insufficiency  h/o DVT (not on anticoagulation)  Polymyalgia rheumatica  Mild cognitive impairment  GERD     Continue with Doxy (Last day 11/30). Imagining reviewed, findings most consistent with aspiration pneumonitis. SLP recommended regular and thin liquids. Currently on 1L NC, wean to maintain SpO2 88-94%, gogo Yuen, IS, supportive care, will need repeat CT in 6 mos  Urine culture no significant growth. MRSA screen+, urinary legionella & strep pneumo ags negative  Repeat imaging showed no progression of subdural hematoma, pt to remain off AC/ asa for 2 weeks per dc summary on 11/22  PT/OT, Fall Precautions  Continue home meds. " seroquel at bedtime, discontinue hydrochlorothiazide and replace with 5mg norvasc    DVT Prophylaxis: SCDs only  Code Status: DNR Comfort Measures Only, son POA  Disposition: Discharge to SNF pending precert     Erik Oakley MD  [facility]

## 2023-11-29 NOTE — PROGRESS NOTES
Physical Therapy    Physical Therapy Treatment    Patient Name: Ayse Cardoza  MRN: [id number]  Today's Date: 11/29/2023  Time Calculation  Start Time: 1340  Stop Time: 1405  Time Calculation (min): 25 min       Assessment/Plan   PT Assessment  End of Session Patient Position:  (Patient supine in bed at end of session. Call light and needs within reach. alarm on, RN aware.)  PT Plan  Inpatient/Swing Bed or Outpatient: Inpatient  PT Plan  Treatment/Interventions: Bed mobility, Transfer training, Gait training  PT Plan: Skilled PT  PT Frequency: 3 times per week  PT Discharge Recommendations: Moderate intensity level of continued care  PT Recommended Transfer Status: Assist x1  PT - OK to Discharge: Yes      General Visit Information:   PT  Visit  PT Received On: 11/29/23  General  Patient Position Received:  (Patient supine in bed on arrival.)  General Comment: Patient pleasant and agreeable to therapy.    Subjective   Precautions:     Vital Signs:       Objective   Treatments:  Therapeutic Exercise  Therapeutic Exercise Performed: Yes  Therapeutic Exercise Activity 1: Patient completed supine therex, B LE: APs, QS, GS, hip abd/add, heel slide, and SAQ all [id number]    Therapeutic Activity  Therapeutic Activity Performed: Yes  Therapeutic Activity 1: Patient tolerated static sitting EOB ~5mins with SBA x1    Bed Mobility  Bed Mobility: Yes  Bed Mobility 1  Bed Mobility Comments 1: Patient required ModA x1 for sit<>sup transfer    Ambulation/Gait Training  Ambulation/Gait Training Performed: Yes  Ambulation/Gait Training 1  Comments/Distance (ft) 1: Patient able to perform side stepping at EOB towards L in preparation for sit>sup transfer ModA x1. First attempt patient unable to lift feet off ground. Second attempt patient able to side step with cues for sequencing and assist with walker management. Mild unsteadiness however, no LOB noted.  Transfers  Transfer: Yes  Transfer 1  Trials/Comments 1: Patient required ModA  x1 for sit<>stand transfer. Cues for hand placement and sequencing.    Outcome Measures:  [facility] Basic Mobility  Turning from your back to your side while in a flat bed without using bedrails: A lot  Moving from lying on your back to sitting on the side of a flat bed without using bedrails: A lot  Moving to and from bed to chair (including a wheelchair): A lot  Standing up from a chair using your arms (e.g. wheelchair or bedside chair): A lot  To walk in hospital room: A lot  Climbing 3-5 steps with railing: Total  Basic Mobility - Total Score: 11    Education Documentation  Mobility Training, taught by Radha Cartwright PTA at 11/29/2023  3:54 PM.  Learner: Patient  Readiness: Acceptance  Method: Explanation  Response: Verbalizes Understanding  Comment: Educated patient on benefits of OOB activity.    Handouts, taught by Radha Cartwright PTA at 11/29/2023  3:54 PM.  Learner: Patient  Readiness: Acceptance  Method: Explanation  Response: Verbalizes Understanding  Comment: Educated patient on benefits of OOB activity.    Body Mechanics, taught by Radha Cartwright PTA at 11/29/2023  3:54 PM.  Learner: Patient  Readiness: Acceptance  Method: Explanation  Response: Verbalizes Understanding  Comment: Educated patient on benefits of OOB activity.    Education Comments  No comments found.        OP EDUCATION:       Encounter Problems       Encounter Problems (Active)       PT Problem       PT Goal 1 (Progressing)       Start:  11/25/23    Expected End:  12/09/23       Min of 1 bed mob          PT Goal 2 (Progressing)       Start:  11/25/23    Expected End:  12/09/23       Min of 1 txs          PT Goal 3 (Progressing)       Start:  11/25/23    Expected End:  12/09/23       20-30 reps ble there ex         PT Goal 4 (Progressing)       Start:  11/25/23    Expected End:  12/09/23       Min f 1 amb w/ rw 100ft x2

## 2023-11-29 NOTE — CARE PLAN
Problem: Pain  Goal: My pain/discomfort is manageable  Outcome: Progressing     Problem: Safety  Goal: Patient will be injury free during hospitalization  Outcome: Progressing  Goal: I will remain free of falls  Outcome: Progressing     Problem: Daily Care  Goal: Daily care needs are met  Outcome: Progressing     Problem: Psychosocial Needs  Goal: Demonstrates ability to cope with hospitalization/illness  Outcome: Progressing  Goal: Collaborate with me, my family, and caregiver to identify my specific goals  Outcome: Progressing     Problem: Discharge Barriers  Goal: My discharge needs are met  Outcome: Progressing     Problem: Skin  Goal: Participates in plan/prevention/treatment measures  Outcome: Progressing  Goal: Prevent/manage excess moisture  Outcome: Progressing  Goal: Prevent/minimize sheer/friction injuries  Outcome: Progressing  Goal: Promote/optimize nutrition  Outcome: Progressing  Goal: Promote skin healing  Outcome: Progressing     Problem: Infection related to problem list condition  Goal: Infection will resolve through treatment  Outcome: Progressing     Problem: Respiratory  Goal: No signs of respiratory distress (eg. Use of accessory muscles. Peds grunting)  Outcome: Progressing  Goal: Wean oxygen to maintain O2 saturation per order/standard this shift  Outcome: Progressing   The patient's goals for the shift include no falls    The clinical goals for the shift include pt will remain safe and comfortable

## 2023-11-30 VITALS
DIASTOLIC BLOOD PRESSURE: 60 MMHG | WEIGHT: 180 LBS | HEART RATE: 71 BPM | SYSTOLIC BLOOD PRESSURE: 124 MMHG | RESPIRATION RATE: 20 BRPM | TEMPERATURE: 97.3 F | OXYGEN SATURATION: 98 % | BODY MASS INDEX: 29.99 KG/M2 | HEIGHT: 65 IN

## 2023-11-30 LAB
ANION GAP SERPL CALC-SCNC: 10 MMOL/L (ref 10–20)
BUN SERPL-MCNC: 27 MG/DL (ref 6–23)
CALCIUM SERPL-MCNC: 8.2 MG/DL (ref 8.6–10.3)
CHLORIDE SERPL-SCNC: 99 MMOL/L (ref 98–107)
CO2 SERPL-SCNC: 28 MMOL/L (ref 21–32)
CREAT SERPL-MCNC: 0.71 MG/DL (ref 0.5–1.05)
ERYTHROCYTE [DISTWIDTH] IN BLOOD BY AUTOMATED COUNT: 16.2 % (ref 11.5–14.5)
GFR SERPL CREATININE-BSD FRML MDRD: 81 ML/MIN/1.73M*2
GLUCOSE SERPL-MCNC: 80 MG/DL (ref 74–99)
HCT VFR BLD AUTO: 32.2 % (ref 36–46)
HGB BLD-MCNC: 10.7 G/DL (ref 12–16)
MAGNESIUM SERPL-MCNC: 1.82 MG/DL (ref 1.6–2.4)
MCH RBC QN AUTO: 31.4 PG (ref 26–34)
MCHC RBC AUTO-ENTMCNC: 33.2 G/DL (ref 32–36)
MCV RBC AUTO: 94 FL (ref 80–100)
NRBC BLD-RTO: 0 /100 WBCS (ref 0–0)
PLATELET # BLD AUTO: 201 X10*3/UL (ref 150–450)
POTASSIUM SERPL-SCNC: 4 MMOL/L (ref 3.5–5.3)
RBC # BLD AUTO: 3.41 X10*6/UL (ref 4–5.2)
SODIUM SERPL-SCNC: 133 MMOL/L (ref 136–145)
WBC # BLD AUTO: 8.1 X10*3/UL (ref 4.4–11.3)

## 2023-11-30 PROCEDURE — 85027 COMPLETE CBC AUTOMATED: CPT

## 2023-11-30 PROCEDURE — 36415 COLL VENOUS BLD VENIPUNCTURE: CPT

## 2023-11-30 PROCEDURE — 2500000001 HC RX 250 WO HCPCS SELF ADMINISTERED DRUGS (ALT 637 FOR MEDICARE OP)

## 2023-11-30 PROCEDURE — 2500000004 HC RX 250 GENERAL PHARMACY W/ HCPCS (ALT 636 FOR OP/ED)

## 2023-11-30 PROCEDURE — 2500000005 HC RX 250 GENERAL PHARMACY W/O HCPCS: Performed by: INTERNAL MEDICINE

## 2023-11-30 PROCEDURE — 2500000001 HC RX 250 WO HCPCS SELF ADMINISTERED DRUGS (ALT 637 FOR MEDICARE OP): Performed by: INTERNAL MEDICINE

## 2023-11-30 PROCEDURE — 99232 SBSQ HOSP IP/OBS MODERATE 35: CPT

## 2023-11-30 PROCEDURE — 83735 ASSAY OF MAGNESIUM: CPT

## 2023-11-30 PROCEDURE — 94640 AIRWAY INHALATION TREATMENT: CPT

## 2023-11-30 PROCEDURE — 2500000002 HC RX 250 W HCPCS SELF ADMINISTERED DRUGS (ALT 637 FOR MEDICARE OP, ALT 636 FOR OP/ED): Performed by: INTERNAL MEDICINE

## 2023-11-30 PROCEDURE — 2500000004 HC RX 250 GENERAL PHARMACY W/ HCPCS (ALT 636 FOR OP/ED): Performed by: INTERNAL MEDICINE

## 2023-11-30 PROCEDURE — 2500000005 HC RX 250 GENERAL PHARMACY W/O HCPCS

## 2023-11-30 PROCEDURE — 80048 BASIC METABOLIC PNL TOTAL CA: CPT

## 2023-11-30 RX ADMIN — OXYBUTYNIN CHLORIDE 5 MG: 5 TABLET ORAL at 08:44

## 2023-11-30 RX ADMIN — IPRATROPIUM BROMIDE AND ALBUTEROL SULFATE 3 ML: 2.5; .5 SOLUTION RESPIRATORY (INHALATION) at 07:06

## 2023-11-30 RX ADMIN — Medication 2 L/MIN: at 07:06

## 2023-11-30 RX ADMIN — POLYETHYLENE GLYCOL 3350 17 G: 17 POWDER, FOR SOLUTION ORAL at 08:44

## 2023-11-30 RX ADMIN — CYANOCOBALAMIN TAB 500 MCG 500 MCG: 500 TAB at 08:44

## 2023-11-30 RX ADMIN — LIDOCAINE 1 PATCH: 4 PATCH TOPICAL at 08:44

## 2023-11-30 RX ADMIN — PANTOPRAZOLE SODIUM 40 MG: 40 TABLET, DELAYED RELEASE ORAL at 06:06

## 2023-11-30 RX ADMIN — AMLODIPINE BESYLATE 5 MG: 5 TABLET ORAL at 08:44

## 2023-11-30 RX ADMIN — BUDESONIDE 0.25 MG: 0.25 INHALANT ORAL at 07:07

## 2023-11-30 RX ADMIN — IPRATROPIUM BROMIDE AND ALBUTEROL SULFATE 3 ML: 2.5; .5 SOLUTION RESPIRATORY (INHALATION) at 13:18

## 2023-11-30 RX ADMIN — CITALOPRAM HYDROBROMIDE 20 MG: 20 TABLET ORAL at 08:44

## 2023-11-30 RX ADMIN — DOXYCYCLINE HYCLATE 100 MG: 100 TABLET, COATED ORAL at 08:44

## 2023-11-30 RX ADMIN — Medication 2 L/MIN: at 13:18

## 2023-11-30 RX ADMIN — HYPROMELLOSE 2910 1 DROP: 5 SOLUTION OPHTHALMIC at 08:44

## 2023-11-30 RX ADMIN — GUAIFENESIN 600 MG: 600 TABLET, EXTENDED RELEASE ORAL at 08:44

## 2023-11-30 RX ADMIN — BRIMONIDINE TARTRATE 1 DROP: 2 SOLUTION/ DROPS OPHTHALMIC at 08:46

## 2023-11-30 ASSESSMENT — PAIN DESCRIPTION - LOCATION: LOCATION: OTHER (COMMENT)

## 2023-11-30 ASSESSMENT — COGNITIVE AND FUNCTIONAL STATUS - GENERAL
CLIMB 3 TO 5 STEPS WITH RAILING: A LOT
TOILETING: A LOT
MOVING TO AND FROM BED TO CHAIR: A LOT
DAILY ACTIVITIY SCORE: 13
DRESSING REGULAR UPPER BODY CLOTHING: A LOT
PERSONAL GROOMING: A LOT
MOVING FROM LYING ON BACK TO SITTING ON SIDE OF FLAT BED WITH BEDRAILS: A LOT
EATING MEALS: A LITTLE
MOBILITY SCORE: 12
HELP NEEDED FOR BATHING: A LOT
DRESSING REGULAR LOWER BODY CLOTHING: A LOT
STANDING UP FROM CHAIR USING ARMS: A LOT
WALKING IN HOSPITAL ROOM: A LOT
TURNING FROM BACK TO SIDE WHILE IN FLAT BAD: A LOT

## 2023-11-30 ASSESSMENT — PAIN - FUNCTIONAL ASSESSMENT: PAIN_FUNCTIONAL_ASSESSMENT: 0-10

## 2023-11-30 ASSESSMENT — PAIN SCALES - GENERAL: PAINLEVEL_OUTOF10: 2

## 2023-11-30 NOTE — PROGRESS NOTES
11/30/23 1020   Discharge Planning   Type of Post Acute Facility Services Skilled nursing   Patient expects to be discharged to: [facility]   Does the patient need discharge transport arranged? Yes   RoundTrip coordination needed? Yes     Precert Status: Approved   Authorization Number:[id number]  Dates: 11/30/2023 - 12/06/2023

## 2023-11-30 NOTE — PROGRESS NOTES
[facility] obtained and transport time set up for 2 pm today, called daughter Alissa and is aware.  Nursing staff also notified of transport time.  Jelly Orozco RN TCC

## 2023-11-30 NOTE — CARE PLAN
The patient's goals for the shift include no falls    The clinical goals for the shift include pt will remain safe and comfortable      Problem: Pain  Goal: My pain/discomfort is manageable  Outcome: Progressing     Problem: Safety  Goal: Patient will be injury free during hospitalization  Outcome: Progressing  Goal: I will remain free of falls  Outcome: Progressing     Problem: Daily Care  Goal: Daily care needs are met  Outcome: Progressing     Problem: Psychosocial Needs  Goal: Demonstrates ability to cope with hospitalization/illness  Outcome: Progressing  Goal: Collaborate with me, my family, and caregiver to identify my specific goals  Outcome: Progressing

## 2023-12-01 ENCOUNTER — NURSING HOME VISIT (OUTPATIENT)
Dept: POST ACUTE CARE | Facility: EXTERNAL LOCATION | Age: 88
End: 2023-12-01
Payer: MEDICARE

## 2023-12-01 DIAGNOSIS — S06.5XAA SDH (SUBDURAL HEMATOMA) (MULTI): ICD-10-CM

## 2023-12-01 DIAGNOSIS — R29.6 MULTIPLE FALLS: Primary | ICD-10-CM

## 2023-12-01 PROCEDURE — 99306 1ST NF CARE HIGH MDM 50: CPT | Performed by: INTERNAL MEDICINE

## 2023-12-01 NOTE — PROGRESS NOTES
"Hospital Medicine Progress Note    Patient Name: Ayse Cardoza   YOB: 1934    Subjective:  Ayse Cardoza is a 89 y.o. female, who is hospital day 6.     Patient denies any chest pain, worsening shortness of breath, fevers or chills. Patient denies any headache as well.    Objective:    /60 (BP Location: Right arm, Patient Position: Lying)   Pulse 71   Temp 36.3 °C (97.3 °F) (Temporal)   Resp 20   Ht 1.651 m (5' 5\")   Wt 81.6 kg (180 lb)   SpO2 98%   BMI 29.95 kg/m²     Physical Exam:  GENERAL: A&Ox2, no distress, alert and cooperative  HEENT: Normocephalic, EOMI, clear sclera, ecchymoses of face, oral mucosa dry  CARDIOVASCULAR: Regular rate and rhythm, no murmurs, rubs, or gallops. S1/S2  RESPIRATORY: BL faint wheezes, no rales or rhonchi. No distress  ABDOMEN: Soft, ND, non-tender. No rebound or guarding. BS+  EXTREMITIES: 2+ peripheral edema, scattered ecchymoses on extremities  NEUROLOGICAL: No focal deficits, some confusion   SKIN: Warm and dry, no rashes  PSYCH: appropriate mood and affect    Assessment/Plan:  Aspiration pneumonitis with hypoxia requiring supplemental O2  Nitrite positive UTI  Acute Metabolic Encephalopathy 2/2 above - improved  Right subdural hematoma, stable  Recurrent Falls, Ambulatory Dysfunction, Debility  Multiple R sided rib fractures    HTN, HLD  CAD  Asthma  Anemia  Venous insufficiency  h/o DVT (not on anticoagulation)  Polymyalgia rheumatica  Mild cognitive impairment  GERD     Continue with Doxy (Last day 11/30). Imagining reviewed, findings most consistent with aspiration pneumonitis. SLP recommended regular and thin liquids. Currently on 1L NC, wean to maintain SpO2 88-94%, Duonebs, pulmicort, IS, supportive care, will need repeat CT in 6 mos  Urine culture no significant growth. MRSA screen+, urinary legionella & strep pneumo ags negative  Repeat imaging showed no progression of subdural hematoma, pt to remain off AC/ asa for 2 weeks per dc " summary on 11/22  PT/OT, Fall Precautions  Continue home meds. seroquel at bedtime, discontinue hydrochlorothiazide and replace with 5mg norvasc    DVT Prophylaxis: SCDs only  Code Status: Prior, son POA  Disposition: Plan for discharge to SNF today.    Erik Oakley MD  [facility] Medicine

## 2023-12-01 NOTE — PROGRESS NOTES
Subjective   Patient ID: Ayse Cardoza is a 89 y.o. female who presents for No chief complaint on file..  HPI        Recent hospital admission status post fall subdural hematoma    Difficult to obtain history at this point states she lost her hearing aids does not have an can communicate her here introduced myself by writing on a sheet of paper advising her nurses will bring the hearing aids and that she denies any overt complaints states she is feeling better        Health Maintenance:      Colonoscopy:      Mammogram:      Pelvic/Pap:      Low dose chest CT:      Aorta duplex:      Optho:      Podiatry:        Vaccines:      Prevnar 20:      Prevnar 13:      Pneumovax 23:      Tdap:      Shingrix:      COVID:      Influenza:        ROS:      General: denies fever/chills/weight loss      Head: denies HA/trauma/masses/dizziness      Eyes: denies vision change/loss of vision/blurry vision/diplopia/eye pain      Ears: denies hearing loss/tinnitus/otalgia/otorrhea      Nose: denies nasal drainage/anosmia      Throat: denies dysphagia/odynophagia      Lymphatics: denies lymph node swelling      Cardiac: denies CP/palpitations/orthopnea/PND      Pulmonary: denies dyspnea/cough/wheezing      GI: denies abd pain/n/v/diarrhea/melena/hematochezia/hematemesis      : denies dysuria/hematuria/change frequency      Genital: denies genital discharge/lesions      Skin: denies rashes/lesions/masses      MSK: denies weakness/swelling/edema/gait imbalance/pain      Neuro: denies paresthesias/seizures/dysarthria      Psych: denies depression/anxiety/suicidal or homicidal ideations            Objective   There were no vitals taken for this visit.     Physical Exam:     General: AO3, NAD     Head: Left facial ecchymosis atraumatic/NC     Eyes: EOMI/PERRLA. Negative APD     Ears: Decreased hearing to finger snap bilateral TM pearly gray, EAC clear. No lesions or erythema     Nose: symmetric nares, no discharge     Throat: trachea  "midline, uvula midline pink mucosa. No thyromegaly     Lymphatics: no cervical/supraclavicular/ant or posterior cervical adenopathy/axillary/inguinal adenopathy     Breast: not examined     Chest: no deformity or tenderness to palpation     Pulm: CTA b/l, no wheeze/rhonchi/rales. nonlabored     Cardiac: RRR +s1s2, no m/r/g.      GI: soft, NT/ND. Normoactive Bsx4. No rebound/guarding.     Rectal: no examined     MSK: 5/5 strength UE LE. No edema/clubbing/cyanosis     Skin: no rashes/lesions     Vascular: 2+ palp DP PT radials b/l. Negative carotid bruit     Neuro: CNII-XII intact. No focal deficits. Reflexes 2/4 brachioradialis bicep tricep patellar achilles. Finger to nose intact.     Psych: appropriate mood/affect                    No results found for: \"BMPR1A\", \"CBCDIF\"      Assessment/Plan   Diagnoses and all orders for this visit:  Multiple falls  SDH (subdural hematoma) (CMS/[facility])    PT OT fall precautions    Spoke to Jelly with nursing to please bring patient hearing aids       Arun Merrill DO  "

## 2023-12-01 NOTE — LETTER
Patient: Ayse Cardoza  : 1934    Encounter Date: 2023    Subjective  Patient ID: Ayse Cardoza is a 89 y.o. female who presents for No chief complaint on file..  HPI        Recent hospital admission status post fall subdural hematoma    Difficult to obtain history at this point states she lost her hearing aids does not have an can communicate her here introduced myself by writing on a sheet of paper advising her nurses will bring the hearing aids and that she denies any overt complaints states she is feeling better        Health Maintenance:      Colonoscopy:      Mammogram:      Pelvic/Pap:      Low dose chest CT:      Aorta duplex:      Optho:      Podiatry:        Vaccines:      Prevnar 20:      Prevnar 13:      Pneumovax 23:      Tdap:      Shingrix:      COVID:      Influenza:        ROS:      General: denies fever/chills/weight loss      Head: denies HA/trauma/masses/dizziness      Eyes: denies vision change/loss of vision/blurry vision/diplopia/eye pain      Ears: denies hearing loss/tinnitus/otalgia/otorrhea      Nose: denies nasal drainage/anosmia      Throat: denies dysphagia/odynophagia      Lymphatics: denies lymph node swelling      Cardiac: denies CP/palpitations/orthopnea/PND      Pulmonary: denies dyspnea/cough/wheezing      GI: denies abd pain/n/v/diarrhea/melena/hematochezia/hematemesis      : denies dysuria/hematuria/change frequency      Genital: denies genital discharge/lesions      Skin: denies rashes/lesions/masses      MSK: denies weakness/swelling/edema/gait imbalance/pain      Neuro: denies paresthesias/seizures/dysarthria      Psych: denies depression/anxiety/suicidal or homicidal ideations            Objective  There were no vitals taken for this visit.     Physical Exam:     General: AO3, NAD     Head: Left facial ecchymosis atraumatic/NC     Eyes: EOMI/PERRLA. Negative APD     Ears: Decreased hearing to finger snap bilateral TM pearly gray, EAC clear. No lesions or  "erythema     Nose: symmetric nares, no discharge     Throat: trachea midline, uvula midline pink mucosa. No thyromegaly     Lymphatics: no cervical/supraclavicular/ant or posterior cervical adenopathy/axillary/inguinal adenopathy     Breast: not examined     Chest: no deformity or tenderness to palpation     Pulm: CTA b/l, no wheeze/rhonchi/rales. nonlabored     Cardiac: RRR +s1s2, no m/r/g.      GI: soft, NT/ND. Normoactive Bsx4. No rebound/guarding.     Rectal: no examined     MSK: 5/5 strength UE LE. No edema/clubbing/cyanosis     Skin: no rashes/lesions     Vascular: 2+ palp DP PT radials b/l. Negative carotid bruit     Neuro: CNII-XII intact. No focal deficits. Reflexes 2/4 brachioradialis bicep tricep patellar achilles. Finger to nose intact.     Psych: appropriate mood/affect                    No results found for: \"BMPR1A\", \"CBCDIF\"      Assessment/Plan  Diagnoses and all orders for this visit:  Multiple falls  SDH (subdural hematoma) (CMS/[facility])    PT OT fall precautions    Spoke to Jelly with nursing to please bring patient hearing aids       Arun Merrill DO      Electronically Signed By: Arun Merrill DO   12/1/23 12:43 PM  "

## 2023-12-06 ENCOUNTER — NURSING HOME VISIT (OUTPATIENT)
Dept: POST ACUTE CARE | Facility: EXTERNAL LOCATION | Age: 88
End: 2023-12-06
Payer: MEDICARE

## 2023-12-06 VITALS
DIASTOLIC BLOOD PRESSURE: 74 MMHG | BODY MASS INDEX: 29.82 KG/M2 | SYSTOLIC BLOOD PRESSURE: 151 MMHG | HEIGHT: 65 IN | OXYGEN SATURATION: 96 % | TEMPERATURE: 97.3 F | HEART RATE: 81 BPM | WEIGHT: 179 LBS | RESPIRATION RATE: 18 BRPM

## 2023-12-06 DIAGNOSIS — R29.6 MULTIPLE FALLS: Primary | ICD-10-CM

## 2023-12-06 DIAGNOSIS — S06.5XAA SDH (SUBDURAL HEMATOMA) (MULTI): ICD-10-CM

## 2023-12-06 DIAGNOSIS — H40.89 OTHER SPECIFIED GLAUCOMA, UNSPECIFIED LATERALITY: ICD-10-CM

## 2023-12-06 DIAGNOSIS — J69.0: ICD-10-CM

## 2023-12-06 DIAGNOSIS — Z74.09 IMPAIRED FUNCTIONAL MOBILITY, BALANCE, GAIT, AND ENDURANCE: ICD-10-CM

## 2023-12-06 DIAGNOSIS — I10 PRIMARY HYPERTENSION: ICD-10-CM

## 2023-12-06 PROCEDURE — 99310 SBSQ NF CARE HIGH MDM 45: CPT | Performed by: PHYSICIAN ASSISTANT

## 2023-12-06 NOTE — LETTER
Patient: Ayse Cardoza  : 1934    Encounter Date: 2023  Name: Ayse Cardoza  YOB: 1934    Chief complaint: Fall at home. Aspiration pneumonia.    HPI: This is a 89 year old  female who has a medical history significant for aspiration pneumonia, falls, subdural hematoma, DVT not on anticoagulation, hypertension, hyperlipidemia, CAD, gerd, polymyalgia rheumatica, anemia, and glaucoma. Patient presented to the ER for evaluation of altered mental status, visual hallucinations, and being more lethargic following another fall at home.  Of note, the patient was recently admitted to the hospital for similar presentation and discharged on 23. Patient was found to have right subdural hematoma (2-3mm). No surgical interventional was performed and hematoma was seen as stable on repeat imaging. Radiographic studies showed right sided pneumonia. She was treated with IV Doxycycline and admitted to medical floor. Pulmonology was consulted and believed x-ray finding were consistent with aspiration. Antibiotics were changed to Meropenem, and Vancomycin. Urine was also positive for UTI. Repeat imaging showed no progression on subdural hematoma. Patient improved and was discharged to SNF for rehab.    Pneumonia  She complains of wheezing. There is no difficulty breathing, hemoptysis or shortness of breath. This is a new problem. The current episode started 1 to 4 weeks ago. The problem occurs intermittently. The problem has been resolved. Pertinent negatives include no appetite change, chest pain, dyspnea on exertion, fever, orthopnea or sore throat. Her symptoms are aggravated by nothing. Her symptoms are alleviated by beta-agonist and rest. She reports moderate improvement on treatment. There are no known risk factors for lung disease. Her past medical history is significant for asthma and pneumonia.     Review of systems:   ROS negative except were noted in HPI.    Code  "Status: full code    /74   Pulse 81   Temp 36.3 °C (97.3 °F)   Resp 18   Ht 1.651 m (5' 5\")   Wt 81.2 kg (179 lb)   SpO2 96%   BMI 29.79 kg/m²      Physical Exam  Constitutional:       General: She is not in acute distress.  HENT:      Head: Normocephalic.      Nose: Nose normal. No congestion.      Mouth/Throat:      Mouth: Mucous membranes are moist.   Eyes:      Extraocular Movements: Extraocular movements intact.      Pupils: Pupils are equal, round, and reactive to light.   Cardiovascular:      Rate and Rhythm: Normal rate and regular rhythm.      Pulses: Normal pulses.   Pulmonary:      Effort: Pulmonary effort is normal.      Breath sounds: Normal breath sounds. No wheezing or rales.   Abdominal:      General: Bowel sounds are normal. There is no distension.      Palpations: Abdomen is soft.      Tenderness: There is no abdominal tenderness. There is no guarding.   Genitourinary:     Comments: Voiding  Musculoskeletal:         General: Normal range of motion.      Cervical back: Normal range of motion.   Lymphadenopathy:      Cervical: No cervical adenopathy.   Skin:     General: Skin is warm and dry.      Capillary Refill: Capillary refill takes less than 2 seconds.   Neurological:      General: No focal deficit present.      Mental Status: She is alert and oriented to person, place, and time.   Psychiatric:         Mood and Affect: Mood normal.         Behavior: Behavior normal.        Medications reviewed during visit at facility.  Latanoprost eye gtt one gtt both eyes daily  Tylenol 650 mg po q 4 hours prn  Vitamin B 12 500 mcg po daily  Sodium Chloride one gram po daily  Restasis 0.05% one gtt both eyes qid  Amlodipine 5 mg po daily  Diclofenac 1% gel to R leg q 8 hours  Omeprazole 20 mg po daily  Doxycycline 100 mg po q 12 hour   Brimonidine tartrate / Timolol 0.2-0.5 one gtt both eyes daily bid  Ipratropium /albuterol unit dose aerosol q 4 hours prn  Calcium Carbonate vit D 500/5 po " daily  Meloxicam 15 mg po daily  Lidocaine patch 4% apply to affected area on 12 hours and off 12 hours  Cetirizine 10 mg po daily  Fluticasone 50 mcg one spray each nostril daily  Alendronate 70 mg po q week  Mirabegron ER 50 mg po daily  Refresh eye gtt one gtt both eyes bid  Citalopram 20 mg po daily   Labs reviewed at facility:    Laboratory: 2023 16:22 CBC and Differential / Basic Metabolic Panl  Latest Version (more available)  Reviewed by [email or web address] on 2023 16:43  Resident Information  Resident: Libra Oro ([id number])  Admit Date: 2023  Admitting Provider:   Attending Provider:   Copy to List:   Report Information  Collection Date: 2023 05:38  Received Date: 2023 05:38  Reported Date: 2023 16:22  Ord. Provider: Arun Merrill  Source Vera: [id number]  Lab Information  Status: Completed  Flag:    Reporting Lab:   [facility]  Order #:   [id number]  Vendor Order #:   Category:   Chemistry, Hematology, Unknown Category  Order Notes  Result for:  LIBRA ORO  ( 1934, F)         Results   Show All Details Result Units Ref. Range Flag Status   CBC and Differential       White Blood Cell Count  10.22 k/uL 3.70-11.00  Final           RBC  3.53 m/uL 3.90-5.20 L Final           Hemoglobin  11.0 g/dL 11.5-15.5 L Final           Hematocrit  35.0 % 36.0-46.0 L Final           MCV  99.2 fL 80.0-100.0  Final           MCH  31.2 pg 26.0-34.0  Final           MCHC  31.4 g/dL 30.5-36.0  Final           RDW-CV  16.2 % 11.5-15.0 H Final           Platelet Count  250 k/uL 150-400  Final           MPV  9.7 fL 9.0-12.7  Final           Neut%  69.6 %   Final           Abs Neut  7.12 k/uL 1.45-7.50  Final           Lymph%  11.1 %   Final           Abs Lymph  1.13 k/uL 1.00-4.00  Final           Mono%  13.7 %   Final           Abs Mono  1.40 k/uL <0.87 H Final           Eosin%  3.9 %   Final           Abs Eosin  0.40 k/uL <0.46  Final            Baso%  0.9 %   Final           Abs Baso  0.09 k/uL <0.11  Final           Immature Gran %%  0.8 %   Final           Abs Immature Gran  0.08 k/uL <0.10  Final           NRBC  0.0 /100 WBC   Final           Absolute nRBC  <0.01 k/uL <0.01  Final           Diff Type  Auto    Final       Basic Metabolic Panl       Glucose  56 mg/dL 74-99 L Final   BUN  24 mg/dL 7-21 H Final           Creatinine  0.66 mg/dL 0.58-0.96  Final           Sodium  136 mmol/L 136-144  Final           Potassium  4.2 mmol/L 3.7-5.1  Final           Chloride  97 mmol/L   Final           CO2  32 mmol/L 22-30 H Final           Anion Gap  7 mmol/L 9-18 L Final           Calcium, Total  8.2 mg/dL 8.5-10.2 L Final           Estimated Glomerular Filtration Rate  84 mL/min/1.73 meters squared >=60  Final      Estimated Glomerular Filtration Rate (eGFR) is calculated using the 2021 CKD-EPI   Assessment/Plan   Problem List Items Addressed This Visit       SDH (subdural hematoma) (CMS/[facility])     Stable. Monitor for change in mental status. Supervised ambulation.         Multiple falls - Primary     Assist with all transfers. Remind patient to ask for assistance.         Impaired functional mobility, balance, gait, and endurance     PT and  OT to assess and treat. Review physical and occupational therapy notes.         Aspiration pneumonia (CMS/[facility])     Consult speech therapy for swallow evaluation. Doxycycline 100 mg po q 12 hour          Other specified glaucoma     Latanoprost eye gtt one gtt both eyes daily. Brimonidine tartrate / Timolol 0.2-0.5 one gtt both eyes daily bid         Primary hypertension     Review BP readings. Continue with Amlodipine 5 mg po daily            Time:  I spent 45 minutes or greater with the patient. Greater than 50% of this time was spent in counseling and or coordination of care. The time includes prep time of reviewing vital signs, report from direct nursing staff and or therapists, hospital documentation, reviewing  labs, radiographs, diagnostic tests and or consultations, time directly spent with the patient interviewing, examining, and education regarding diagnosis, treatments, and medications, as well as documentation in the electronic medical record, and reviewing the plan of care and any new orders with the patient, nursing staff and other staff directly related to the patients care.      Jonathan Cruz PA-C       Electronically Signed By: Jonathan Cruz PA-C   12/9/23 10:22 AM

## 2023-12-07 NOTE — PROGRESS NOTES
12/6/2023  Name: Ayse Cardoza  YOB: 1934    Chief complaint: Fall at home. Aspiration pneumonia.    HPI: This is a 89 year old  female who has a medical history significant for aspiration pneumonia, falls, subdural hematoma, DVT not on anticoagulation, hypertension, hyperlipidemia, CAD, gerd, polymyalgia rheumatica, anemia, and glaucoma. Patient presented to the ER for evaluation of altered mental status, visual hallucinations, and being more lethargic following another fall at home.  Of note, the patient was recently admitted to the hospital for similar presentation and discharged on 11/22/23. Patient was found to have right subdural hematoma (2-3mm). No surgical interventional was performed and hematoma was seen as stable on repeat imaging. Radiographic studies showed right sided pneumonia. She was treated with IV Doxycycline and admitted to medical floor. Pulmonology was consulted and believed x-ray finding were consistent with aspiration. Antibiotics were changed to Meropenem, and Vancomycin. Urine was also positive for UTI. Repeat imaging showed no progression on subdural hematoma. Patient improved and was discharged to SNF for rehab.    Pneumonia  She complains of wheezing. There is no difficulty breathing, hemoptysis or shortness of breath. This is a new problem. The current episode started 1 to 4 weeks ago. The problem occurs intermittently. The problem has been resolved. Pertinent negatives include no appetite change, chest pain, dyspnea on exertion, fever, orthopnea or sore throat. Her symptoms are aggravated by nothing. Her symptoms are alleviated by beta-agonist and rest. She reports moderate improvement on treatment. There are no known risk factors for lung disease. Her past medical history is significant for asthma and pneumonia.     Review of systems:   ROS negative except were noted in HPI.    Code Status: full code    /74   Pulse 81   Temp 36.3 °C (97.3 °F)    "Resp 18   Ht 1.651 m (5' 5\")   Wt 81.2 kg (179 lb)   SpO2 96%   BMI 29.79 kg/m²      Physical Exam  Constitutional:       General: She is not in acute distress.  HENT:      Head: Normocephalic.      Nose: Nose normal. No congestion.      Mouth/Throat:      Mouth: Mucous membranes are moist.   Eyes:      Extraocular Movements: Extraocular movements intact.      Pupils: Pupils are equal, round, and reactive to light.   Cardiovascular:      Rate and Rhythm: Normal rate and regular rhythm.      Pulses: Normal pulses.   Pulmonary:      Effort: Pulmonary effort is normal.      Breath sounds: Normal breath sounds. No wheezing or rales.   Abdominal:      General: Bowel sounds are normal. There is no distension.      Palpations: Abdomen is soft.      Tenderness: There is no abdominal tenderness. There is no guarding.   Genitourinary:     Comments: Voiding  Musculoskeletal:         General: Normal range of motion.      Cervical back: Normal range of motion.   Lymphadenopathy:      Cervical: No cervical adenopathy.   Skin:     General: Skin is warm and dry.      Capillary Refill: Capillary refill takes less than 2 seconds.   Neurological:      General: No focal deficit present.      Mental Status: She is alert and oriented to person, place, and time.   Psychiatric:         Mood and Affect: Mood normal.         Behavior: Behavior normal.        Medications reviewed during visit at facility.  Latanoprost eye gtt one gtt both eyes daily  Tylenol 650 mg po q 4 hours prn  Vitamin B 12 500 mcg po daily  Sodium Chloride one gram po daily  Restasis 0.05% one gtt both eyes qid  Amlodipine 5 mg po daily  Diclofenac 1% gel to R leg q 8 hours  Omeprazole 20 mg po daily  Doxycycline 100 mg po q 12 hour   Brimonidine tartrate / Timolol 0.2-0.5 one gtt both eyes daily bid  Ipratropium /albuterol unit dose aerosol q 4 hours prn  Calcium Carbonate vit D 500/5 po daily  Meloxicam 15 mg po daily  Lidocaine patch 4% apply to affected area on " 12 hours and off 12 hours  Cetirizine 10 mg po daily  Fluticasone 50 mcg one spray each nostril daily  Alendronate 70 mg po q week  Mirabegron ER 50 mg po daily  Refresh eye gtt one gtt both eyes bid  Citalopram 20 mg po daily   Labs reviewed at facility:    Laboratory: 2023 16:22 CBC and Differential / Basic Metabolic Panl  Latest Version (more available)  Reviewed by [email or web address] on 2023 16:43  Resident Information  Resident: Libra Oro ([id number])  Admit Date: 2023  Admitting Provider:   Attending Provider:   Copy to List:   Report Information  Collection Date: 2023 05:38  Received Date: 2023 05:38  Reported Date: 2023 16:22  Ord. Provider: Arun Merrill  Source Vera: [email or web address]  Lab Information  Status: Completed  Flag:    Reporting Lab:   [facility]  Order #:   [id number]  Vendor Order #:   Category:   Chemistry, Hematology, Unknown Category  Order Notes  Result for:  LIBRA ORO  ( 1934, F)         Results   Show All Details Result Units Ref. Range Flag Status   CBC and Differential       White Blood Cell Count  10.22 k/uL 3.70-11.00  Final           RBC  3.53 m/uL 3.90-5.20 L Final           Hemoglobin  11.0 g/dL 11.5-15.5 L Final           Hematocrit  35.0 % 36.0-46.0 L Final           MCV  99.2 fL 80.0-100.0  Final           MCH  31.2 pg 26.0-34.0  Final           MCHC  31.4 g/dL 30.5-36.0  Final           RDW-CV  16.2 % 11.5-15.0 H Final           Platelet Count  250 k/uL 150-400  Final           MPV  9.7 fL 9.0-12.7  Final           Neut%  69.6 %   Final           Abs Neut  7.12 k/uL 1.45-7.50  Final           Lymph%  11.1 %   Final           Abs Lymph  1.13 k/uL 1.00-4.00  Final           Mono%  13.7 %   Final           Abs Mono  1.40 k/uL <0.87 H Final           Eosin%  3.9 %   Final           Abs Eosin  0.40 k/uL <0.46  Final           Baso%  0.9 %   Final           Abs Baso  0.09 k/uL <0.11  Final           Immature  Gran %%  0.8 %   Final           Abs Immature Gran  0.08 k/uL <0.10  Final           NRBC  0.0 /100 WBC   Final           Absolute nRBC  <0.01 k/uL <0.01  Final           Diff Type  Auto    Final       Basic Metabolic Panl       Glucose  56 mg/dL 74-99 L Final   BUN  24 mg/dL 7-21 H Final           Creatinine  0.66 mg/dL 0.58-0.96  Final           Sodium  136 mmol/L 136-144  Final           Potassium  4.2 mmol/L 3.7-5.1  Final           Chloride  97 mmol/L   Final           CO2  32 mmol/L 22-30 H Final           Anion Gap  7 mmol/L 9-18 L Final           Calcium, Total  8.2 mg/dL 8.5-10.2 L Final           Estimated Glomerular Filtration Rate  84 mL/min/1.73 meters squared >=60  Final      Estimated Glomerular Filtration Rate (eGFR) is calculated using the 2021 CKD-EPI   Assessment/Plan    Problem List Items Addressed This Visit       SDH (subdural hematoma) (CMS/[facility])     Stable. Monitor for change in mental status. Supervised ambulation.         Multiple falls - Primary     Assist with all transfers. Remind patient to ask for assistance.         Impaired functional mobility, balance, gait, and endurance     PT and  OT to assess and treat. Review physical and occupational therapy notes.         Aspiration pneumonia (CMS/[facility])     Consult speech therapy for swallow evaluation. Doxycycline 100 mg po q 12 hour          Other specified glaucoma     Latanoprost eye gtt one gtt both eyes daily. Brimonidine tartrate / Timolol 0.2-0.5 one gtt both eyes daily bid         Primary hypertension     Review BP readings. Continue with Amlodipine 5 mg po daily            Time:  I spent 45 minutes or greater with the patient. Greater than 50% of this time was spent in counseling and or coordination of care. The time includes prep time of reviewing vital signs, report from direct nursing staff and or therapists, hospital documentation, reviewing labs, radiographs, diagnostic tests and or consultations, time directly spent with  the patient interviewing, examining, and education regarding diagnosis, treatments, and medications, as well as documentation in the electronic medical record, and reviewing the plan of care and any new orders with the patient, nursing staff and other staff directly related to the patients care.      Jonathan Cruz PA-C

## 2023-12-09 PROBLEM — I10 PRIMARY HYPERTENSION: Status: ACTIVE | Noted: 2023-12-09

## 2023-12-09 PROBLEM — H40.89 OTHER SPECIFIED GLAUCOMA: Status: ACTIVE | Noted: 2023-12-09

## 2023-12-09 PROBLEM — J69.0 ASPIRATION PNEUMONIA (MULTI): Status: ACTIVE | Noted: 2023-12-09

## 2023-12-09 PROBLEM — Z74.09 IMPAIRED FUNCTIONAL MOBILITY, BALANCE, GAIT, AND ENDURANCE: Status: ACTIVE | Noted: 2023-12-09

## 2023-12-09 ASSESSMENT — ENCOUNTER SYMPTOMS
HEMOPTYSIS: 0
DIFFICULTY BREATHING: 0
DYSPNEA ON EXERTION: 0
SORE THROAT: 0
ORTHOPNEA: 0
FEVER: 0
SHORTNESS OF BREATH: 0
APPETITE CHANGE: 0
WHEEZING: 1

## 2023-12-09 NOTE — ASSESSMENT & PLAN NOTE
Latanoprost eye gtt one gtt both eyes daily. Brimonidine tartrate / Timolol 0.2-0.5 one gtt both eyes daily bid

## 2023-12-13 ENCOUNTER — NURSING HOME VISIT (OUTPATIENT)
Dept: POST ACUTE CARE | Facility: EXTERNAL LOCATION | Age: 88
End: 2023-12-13
Payer: MEDICARE

## 2023-12-13 VITALS
BODY MASS INDEX: 29.82 KG/M2 | HEIGHT: 65 IN | SYSTOLIC BLOOD PRESSURE: 124 MMHG | WEIGHT: 179 LBS | OXYGEN SATURATION: 100 % | DIASTOLIC BLOOD PRESSURE: 71 MMHG | TEMPERATURE: 97.7 F | HEART RATE: 79 BPM | RESPIRATION RATE: 18 BRPM

## 2023-12-13 DIAGNOSIS — J69.0: Primary | ICD-10-CM

## 2023-12-13 DIAGNOSIS — H40.89 OTHER SPECIFIED GLAUCOMA, UNSPECIFIED LATERALITY: ICD-10-CM

## 2023-12-13 DIAGNOSIS — Z74.09 IMPAIRED FUNCTIONAL MOBILITY, BALANCE, GAIT, AND ENDURANCE: ICD-10-CM

## 2023-12-13 DIAGNOSIS — S06.5XAA SDH (SUBDURAL HEMATOMA) (MULTI): ICD-10-CM

## 2023-12-13 PROCEDURE — 99309 SBSQ NF CARE MODERATE MDM 30: CPT | Performed by: PHYSICIAN ASSISTANT

## 2023-12-13 ASSESSMENT — ENCOUNTER SYMPTOMS
SHORTNESS OF BREATH: 0
COUGH: 0
FEVER: 0
TROUBLE SWALLOWING: 0
HOARSE VOICE: 0
FREQUENT THROAT CLEARING: 0
HEARTBURN: 0
DYSPNEA ON EXERTION: 0
APPETITE CHANGE: 0
DIFFICULTY BREATHING: 0

## 2023-12-13 NOTE — LETTER
"Patient: Ayse Cardoza  : 1934    Encounter Date: 2023  Name: Ayse Cardoza  YOB: 1934    Chief complaint: Follow up for aspiration pneumonia.    HPI: Patient seen and examined in her room. She is resting comfortably. Patient describes her breathing as \" good \". She is wearing oxygen at 2 liters per nasal cannula. Nursing reports no coughing or choking when eating. Review of lab work negative for leukocytosis. She is afebrile.   Pneumonia  There is no cough, difficulty breathing, frequent throat clearing, hoarse voice or shortness of breath. This is a new problem. The current episode started 1 to 4 weeks ago. The problem has been resolved. Pertinent negatives include no appetite change, dyspnea on exertion, fever, heartburn or trouble swallowing. Her symptoms are aggravated by nothing. Her symptoms are alleviated by rest, beta-agonist and ipratropium. She reports significant improvement on treatment. Her past medical history is significant for pneumonia.     Review of systems:   ROS negative except were noted in HPI.    Code Status: full code    /71   Pulse 79   Temp 36.5 °C (97.7 °F)   Resp 18   Ht 1.651 m (5' 5\")   Wt 81.2 kg (179 lb)   SpO2 100%   BMI 29.79 kg/m²      Physical Exam  Constitutional:       General: She is not in acute distress.  HENT:      Head: Normocephalic.      Nose: Nose normal. No congestion.      Mouth/Throat:      Mouth: Mucous membranes are moist.   Eyes:      Extraocular Movements: Extraocular movements intact.      Pupils: Pupils are equal, round, and reactive to light.   Cardiovascular:      Rate and Rhythm: Normal rate and regular rhythm.      Pulses: Normal pulses.   Pulmonary:      Effort: Pulmonary effort is normal.      Breath sounds: Normal breath sounds. No wheezing or rales.   Abdominal:      General: Bowel sounds are normal. There is no distension.      Palpations: Abdomen is soft.      Tenderness: There is no abdominal " tenderness. There is no guarding.   Genitourinary:     Comments: Voiding  Musculoskeletal:         General: Normal range of motion.      Cervical back: Normal range of motion.   Lymphadenopathy:      Cervical: No cervical adenopathy.   Skin:     General: Skin is warm and dry.      Capillary Refill: Capillary refill takes less than 2 seconds.   Neurological:      General: No focal deficit present.      Mental Status: She is alert and oriented to person, place, and time.   Psychiatric:         Mood and Affect: Mood normal.         Behavior: Behavior normal.     Medications reviewed during visit at facility.   Latanoprost eye gtt one gtt both eyes daily  Tylenol 650 mg po q 4 hours prn  Vitamin B 12 500 mcg po daily  Sodium Chloride one gram po daily  Restasis 0.05% one gtt both eyes qid  Amlodipine 5 mg po daily  Diclofenac 1% gel to R leg q 8 hours  Omeprazole 20 mg po daily  Doxycycline 100 mg po q 12 hour   Brimonidine tartrate / Timolol 0.2-0.5 one gtt both eyes daily bid  Ipratropium /albuterol unit dose aerosol q 4 hours prn  Calcium Carbonate vit D 500/5 po daily  Meloxicam 15 mg po daily  Lidocaine patch 4% apply to affected area on 12 hours and off 12 hours  Cetirizine 10 mg po daily  Fluticasone 50 mcg one spray each nostril daily  Alendronate 70 mg po q week  Mirabegron ER 50 mg po daily  Refresh eye gtt one gtt both eyes bid  Citalopram 20 mg po daily     Labs reviewed at facility:    Laboratory: 12/11/2023 13:17 CBC and Differential / Basic Metabolic Panl  Latest Version  Reviewed by [facility] on 12/12/2023 13:06  Resident Information  Resident: Ayse Cardoza ([id number])  Admit Date: 11/30/2023  Admitting Provider:   Attending Provider:   Copy to List:   Report Information  Collection Date: 12/11/2023 05:38  Received Date: 12/11/2023 05:38  Reported Date: 12/11/2023 13:17  Ord. Provider: Arun Merrill  Source Vera: [id number]  Lab Information  Status: Completed  Flag:    Reporting Lab:   [facility]  [facility]  Order #:   [id number]  Vendor Order #:   Category:   Chemistry, Hematology, Unknown Category  Order Notes  Result for:  LIBRA ORO  ( 1934, F)         Results   Show All Details Result Units Ref. Range Flag Status   CBC and Differential       White Blood Cell Count  7.44 k/uL 3.70-11.00  Final           RBC  3.33 m/uL 3.90-5.20 L Final           Hemoglobin  10.3 g/dL 11.5-15.5 L Final           Hematocrit  32.4 % 36.0-46.0 L Final           MCV  97.3 fL 80.0-100.0  Final           MCH  30.9 pg 26.0-34.0  Final           MCHC  31.8 g/dL 30.5-36.0  Final           RDW-CV  16.3 % 11.5-15.0 H Final           Platelet Count  234 k/uL 150-400  Final           MPV  9.8 fL 9.0-12.7  Final           Neut%  56.9 %   Final           Abs Neut  4.24 k/uL 1.45-7.50  Final           Lymph%  17.2 %   Final           Abs Lymph  1.28 k/uL 1.00-4.00  Final           Mono%  16.0 %   Final           Abs Mono  1.19 k/uL <0.87 H Final           Eosin%  8.1 %   Final           Abs Eosin  0.60 k/uL <0.46 H Final           Baso%  0.7 %   Final           Abs Baso  0.05 k/uL <0.11  Final           Immature Gran %%  1.1 %   Final           Abs Immature Gran  0.08 k/uL <0.10  Final           NRBC  0.0 /100 WBC   Final           Absolute nRBC  <0.01 k/uL <0.01  Final           Diff Type  Auto    Final       Basic Metabolic Panl       Glucose  56 mg/dL 74-99 L Fin  BUN  28 mg/dL 7-21 H Final           Creatinine  0.68 mg/dL 0.58-0.96  Final           Sodium  138 mmol/L 136-144  Final           Potassium  4.1 mmol/L 3.7-5.1  Final           Chloride  98 mmol/L   Final           CO2  31 mmol/L 22-30 H Final           Anion Gap  9 mmol/L 9-18  Final           Calcium, Total  8.8 mg/dL 8.5-10.2  Final           Estimated Glomerular Filtration Rate  83 mL/min/1.73 meters squared >=60   Assessment/Plan   Problem List Items Addressed This Visit       SDH (subdural hematoma) (CMS/HCC)     Stable.  Monitor for change in mental status. Supervised ambulation.          Impaired functional mobility, balance, gait, and endurance     Review physical and occupational therapy notes. Not walking. Max assist for transfers and repositioning.         Aspiration pneumonia (CMS/HCC) - Primary     Completed Doxycycline. Patient to eat and drink while sitting up. Review speech therapy notes.         Other specified glaucoma     Latanoprost eye gtt one gtt both eyes daily. Brimonidine tartrate / Timolol 0.2-0.5 one gtt both eyes daily bid             Time:    Jonathan Cruz PA-C       Electronically Signed By: Jonathan Cruz PA-C   12/13/23  9:00 PM

## 2023-12-14 NOTE — ASSESSMENT & PLAN NOTE
Review physical and occupational therapy notes. Not walking. Max assist for transfers and repositioning.

## 2023-12-14 NOTE — PROGRESS NOTES
"12/13/23  Name: Ayse Cardoza  YOB: 1934    Chief complaint: Follow up for aspiration pneumonia.    HPI: Patient seen and examined in her room. She is resting comfortably. Patient describes her breathing as \" good \". She is wearing oxygen at 2 liters per nasal cannula. Nursing reports no coughing or choking when eating. Review of lab work negative for leukocytosis. She is afebrile.   Pneumonia  There is no cough, difficulty breathing, frequent throat clearing, hoarse voice or shortness of breath. This is a new problem. The current episode started 1 to 4 weeks ago. The problem has been resolved. Pertinent negatives include no appetite change, dyspnea on exertion, fever, heartburn or trouble swallowing. Her symptoms are aggravated by nothing. Her symptoms are alleviated by rest, beta-agonist and ipratropium. She reports significant improvement on treatment. Her past medical history is significant for pneumonia.     Review of systems:   ROS negative except were noted in HPI.    Code Status: full code    /71   Pulse 79   Temp 36.5 °C (97.7 °F)   Resp 18   Ht 1.651 m (5' 5\")   Wt 81.2 kg (179 lb)   SpO2 100%   BMI 29.79 kg/m²      Physical Exam  Constitutional:       General: She is not in acute distress.  HENT:      Head: Normocephalic.      Nose: Nose normal. No congestion.      Mouth/Throat:      Mouth: Mucous membranes are moist.   Eyes:      Extraocular Movements: Extraocular movements intact.      Pupils: Pupils are equal, round, and reactive to light.   Cardiovascular:      Rate and Rhythm: Normal rate and regular rhythm.      Pulses: Normal pulses.   Pulmonary:      Effort: Pulmonary effort is normal.      Breath sounds: Normal breath sounds. No wheezing or rales.   Abdominal:      General: Bowel sounds are normal. There is no distension.      Palpations: Abdomen is soft.      Tenderness: There is no abdominal tenderness. There is no guarding.   Genitourinary:     Comments: " Voiding  Musculoskeletal:         General: Normal range of motion.      Cervical back: Normal range of motion.   Lymphadenopathy:      Cervical: No cervical adenopathy.   Skin:     General: Skin is warm and dry.      Capillary Refill: Capillary refill takes less than 2 seconds.   Neurological:      General: No focal deficit present.      Mental Status: She is alert and oriented to person, place, and time.   Psychiatric:         Mood and Affect: Mood normal.         Behavior: Behavior normal.     Medications reviewed during visit at facility.   Latanoprost eye gtt one gtt both eyes daily  Tylenol 650 mg po q 4 hours prn  Vitamin B 12 500 mcg po daily  Sodium Chloride one gram po daily  Restasis 0.05% one gtt both eyes qid  Amlodipine 5 mg po daily  Diclofenac 1% gel to R leg q 8 hours  Omeprazole 20 mg po daily  Doxycycline 100 mg po q 12 hour   Brimonidine tartrate / Timolol 0.2-0.5 one gtt both eyes daily bid  Ipratropium /albuterol unit dose aerosol q 4 hours prn  Calcium Carbonate vit D 500/5 po daily  Meloxicam 15 mg po daily  Lidocaine patch 4% apply to affected area on 12 hours and off 12 hours  Cetirizine 10 mg po daily  Fluticasone 50 mcg one spray each nostril daily  Alendronate 70 mg po q week  Mirabegron ER 50 mg po daily  Refresh eye gtt one gtt both eyes bid  Citalopram 20 mg po daily     Labs reviewed at facility:    Laboratory: 12/11/2023 13:17 CBC and Differential / Basic Metabolic Panl  Latest Version  Reviewed by [email or web address] on 12/12/2023 13:06  Resident Information  Resident: Ayse Cardoza ([id number])  Admit Date: 11/30/2023  Admitting Provider:   Attending Provider:   Copy to List:   Report Information  Collection Date: 12/11/2023 05:38  Received Date: 12/11/2023 05:38  Reported Date: 12/11/2023 13:17  Ord. Provider: Arun Merrill  Source Vera: [id number]  Lab Information  Status: Completed  Flag:    Reporting Lab:   [facility]  Order #:   [id number]  Vendor  Order #:   Category:   Chemistry, Hematology, Unknown Category  Order Notes  Result for:  LIBRA ORO  ( 1934, F)         Results   Show All Details Result Units Ref. Range Flag Status   CBC and Differential       White Blood Cell Count  7.44 k/uL 3.70-11.00  Final           RBC  3.33 m/uL 3.90-5.20 L Final           Hemoglobin  10.3 g/dL 11.5-15.5 L Final           Hematocrit  32.4 % 36.0-46.0 L Final           MCV  97.3 fL 80.0-100.0  Final           MCH  30.9 pg 26.0-34.0  Final           MCHC  31.8 g/dL 30.5-36.0  Final           RDW-CV  16.3 % 11.5-15.0 H Final           Platelet Count  234 k/uL 150-400  Final           MPV  9.8 fL 9.0-12.7  Final           Neut%  56.9 %   Final           Abs Neut  4.24 k/uL 1.45-7.50  Final           Lymph%  17.2 %   Final           Abs Lymph  1.28 k/uL 1.00-4.00  Final           Mono%  16.0 %   Final           Abs Mono  1.19 k/uL <0.87 H Final           Eosin%  8.1 %   Final           Abs Eosin  0.60 k/uL <0.46 H Final           Baso%  0.7 %   Final           Abs Baso  0.05 k/uL <0.11  Final           Immature Gran %%  1.1 %   Final           Abs Immature Gran  0.08 k/uL <0.10  Final           NRBC  0.0 /100 WBC   Final           Absolute nRBC  <0.01 k/uL <0.01  Final           Diff Type  Auto    Final       Basic Metabolic Panl       Glucose  56 mg/dL 74-99 L Fin  BUN  28 mg/dL 7-21 H Final           Creatinine  0.68 mg/dL 0.58-0.96  Final           Sodium  138 mmol/L 136-144  Final           Potassium  4.1 mmol/L 3.7-5.1  Final           Chloride  98 mmol/L   Final           CO2  31 mmol/L 22-30 H Final           Anion Gap  9 mmol/L 9-18  Final           Calcium, Total  8.8 mg/dL 8.5-10.2  Final           Estimated Glomerular Filtration Rate  83 mL/min/1.73 meters squared >=60   Assessment/Plan    Problem List Items Addressed This Visit       SDH (subdural hematoma) (CMS/HCC)     Stable. Monitor for change in mental status. Supervised ambulation.           Impaired functional mobility, balance, gait, and endurance     Review physical and occupational therapy notes. Not walking. Max assist for transfers and repositioning.         Aspiration pneumonia (CMS/HCC) - Primary     Completed Doxycycline. Patient to eat and drink while sitting up. Review speech therapy notes.         Other specified glaucoma     Latanoprost eye gtt one gtt both eyes daily. Brimonidine tartrate / Timolol 0.2-0.5 one gtt both eyes daily bid             Time:    Jonathan Cruz PA-C

## 2023-12-22 ENCOUNTER — HOSPITAL ENCOUNTER (OUTPATIENT)
Dept: CARDIOLOGY | Facility: HOSPITAL | Age: 88
Discharge: HOME | End: 2023-12-22
Payer: MEDICARE

## 2023-12-22 PROCEDURE — 93005 ELECTROCARDIOGRAM TRACING: CPT

## 2023-12-29 LAB
ATRIAL RATE: 68 BPM
P OFFSET: 160 MS
P ONSET: 121 MS
PR INTERVAL: 194 MS
Q ONSET: 218 MS
QRS COUNT: 12 BEATS
QRS DURATION: 82 MS
QT INTERVAL: 424 MS
QTC CALCULATION(BAZETT): 450 MS
QTC FREDERICIA: 442 MS
R AXIS: 46 DEGREES
T AXIS: 32 DEGREES
T OFFSET: 430 MS
VENTRICULAR RATE: 68 BPM